# Patient Record
Sex: FEMALE | Race: WHITE | NOT HISPANIC OR LATINO | Employment: FULL TIME | URBAN - METROPOLITAN AREA
[De-identification: names, ages, dates, MRNs, and addresses within clinical notes are randomized per-mention and may not be internally consistent; named-entity substitution may affect disease eponyms.]

---

## 2017-09-25 ENCOUNTER — APPOINTMENT (OUTPATIENT)
Dept: PHYSICAL THERAPY | Facility: REHABILITATION | Age: 37
End: 2017-09-25
Payer: COMMERCIAL

## 2017-09-25 PROCEDURE — 97110 THERAPEUTIC EXERCISES: CPT

## 2017-09-25 PROCEDURE — G8984 CARRY CURRENT STATUS: HCPCS

## 2017-09-25 PROCEDURE — G0283 ELEC STIM OTHER THAN WOUND: HCPCS

## 2017-09-25 PROCEDURE — 97161 PT EVAL LOW COMPLEX 20 MIN: CPT

## 2017-09-25 PROCEDURE — G8985 CARRY GOAL STATUS: HCPCS

## 2017-09-25 PROCEDURE — 97014 ELECTRIC STIMULATION THERAPY: CPT

## 2017-10-02 ENCOUNTER — APPOINTMENT (OUTPATIENT)
Dept: PHYSICAL THERAPY | Facility: REHABILITATION | Age: 37
End: 2017-10-02
Payer: COMMERCIAL

## 2017-10-02 PROCEDURE — 97140 MANUAL THERAPY 1/> REGIONS: CPT

## 2017-10-02 PROCEDURE — 97014 ELECTRIC STIMULATION THERAPY: CPT

## 2017-10-02 PROCEDURE — G0283 ELEC STIM OTHER THAN WOUND: HCPCS

## 2017-10-02 PROCEDURE — 97110 THERAPEUTIC EXERCISES: CPT

## 2017-10-05 ENCOUNTER — APPOINTMENT (OUTPATIENT)
Dept: PHYSICAL THERAPY | Facility: REHABILITATION | Age: 37
End: 2017-10-05
Payer: COMMERCIAL

## 2017-10-05 PROCEDURE — 97110 THERAPEUTIC EXERCISES: CPT

## 2017-10-05 PROCEDURE — 97140 MANUAL THERAPY 1/> REGIONS: CPT

## 2017-10-05 PROCEDURE — 97014 ELECTRIC STIMULATION THERAPY: CPT

## 2017-10-05 PROCEDURE — G0283 ELEC STIM OTHER THAN WOUND: HCPCS

## 2017-10-09 ENCOUNTER — APPOINTMENT (OUTPATIENT)
Dept: PHYSICAL THERAPY | Facility: REHABILITATION | Age: 37
End: 2017-10-09
Payer: COMMERCIAL

## 2017-10-09 PROCEDURE — 97110 THERAPEUTIC EXERCISES: CPT

## 2017-10-09 PROCEDURE — G0283 ELEC STIM OTHER THAN WOUND: HCPCS

## 2017-10-09 PROCEDURE — 97014 ELECTRIC STIMULATION THERAPY: CPT

## 2017-10-16 ENCOUNTER — APPOINTMENT (OUTPATIENT)
Dept: PHYSICAL THERAPY | Facility: REHABILITATION | Age: 37
End: 2017-10-16
Payer: COMMERCIAL

## 2017-10-19 ENCOUNTER — APPOINTMENT (OUTPATIENT)
Dept: PHYSICAL THERAPY | Facility: REHABILITATION | Age: 37
End: 2017-10-19
Payer: COMMERCIAL

## 2017-10-19 PROCEDURE — G0283 ELEC STIM OTHER THAN WOUND: HCPCS

## 2017-10-19 PROCEDURE — 97014 ELECTRIC STIMULATION THERAPY: CPT

## 2017-10-19 PROCEDURE — 97110 THERAPEUTIC EXERCISES: CPT

## 2017-10-19 PROCEDURE — 97140 MANUAL THERAPY 1/> REGIONS: CPT

## 2017-10-23 ENCOUNTER — APPOINTMENT (OUTPATIENT)
Dept: PHYSICAL THERAPY | Facility: REHABILITATION | Age: 37
End: 2017-10-23
Payer: COMMERCIAL

## 2017-10-26 ENCOUNTER — APPOINTMENT (OUTPATIENT)
Dept: PHYSICAL THERAPY | Facility: REHABILITATION | Age: 37
End: 2017-10-26
Payer: COMMERCIAL

## 2017-10-26 PROCEDURE — 97014 ELECTRIC STIMULATION THERAPY: CPT

## 2017-10-26 PROCEDURE — G0283 ELEC STIM OTHER THAN WOUND: HCPCS

## 2017-10-26 PROCEDURE — 97140 MANUAL THERAPY 1/> REGIONS: CPT

## 2017-10-26 PROCEDURE — 97110 THERAPEUTIC EXERCISES: CPT

## 2017-10-30 ENCOUNTER — APPOINTMENT (OUTPATIENT)
Dept: PHYSICAL THERAPY | Facility: REHABILITATION | Age: 37
End: 2017-10-30
Payer: COMMERCIAL

## 2017-10-30 PROCEDURE — 97014 ELECTRIC STIMULATION THERAPY: CPT

## 2017-10-30 PROCEDURE — 97110 THERAPEUTIC EXERCISES: CPT

## 2017-10-30 PROCEDURE — 97140 MANUAL THERAPY 1/> REGIONS: CPT

## 2017-10-30 PROCEDURE — G0283 ELEC STIM OTHER THAN WOUND: HCPCS

## 2018-07-07 DIAGNOSIS — J30.9 ALLERGIC RHINITIS, UNSPECIFIED SEASONALITY, UNSPECIFIED TRIGGER: Primary | ICD-10-CM

## 2018-07-08 RX ORDER — MONTELUKAST SODIUM 10 MG/1
TABLET ORAL
Qty: 90 TABLET | Refills: 2 | Status: SHIPPED | OUTPATIENT
Start: 2018-07-08 | End: 2019-03-31 | Stop reason: SDUPTHER

## 2018-10-18 DIAGNOSIS — F41.9 ANXIETY: Primary | ICD-10-CM

## 2018-12-11 DIAGNOSIS — F41.9 ANXIETY: ICD-10-CM

## 2019-01-09 DIAGNOSIS — M25.50 ARTHRALGIA, UNSPECIFIED JOINT: Primary | ICD-10-CM

## 2019-01-09 RX ORDER — NAPROXEN 500 MG/1
TABLET ORAL
Qty: 60 TABLET | Refills: 0 | Status: SHIPPED | OUTPATIENT
Start: 2019-01-09 | End: 2019-08-15 | Stop reason: SDUPTHER

## 2019-03-03 DIAGNOSIS — F41.9 ANXIETY: ICD-10-CM

## 2019-03-05 NOTE — TELEPHONE ENCOUNTER
Lm that she needs an ov before next appt and she is to call and schedule so there is no delay in her next refill

## 2019-03-31 DIAGNOSIS — J30.9 ALLERGIC RHINITIS, UNSPECIFIED SEASONALITY, UNSPECIFIED TRIGGER: ICD-10-CM

## 2019-04-01 RX ORDER — MONTELUKAST SODIUM 10 MG/1
TABLET ORAL
Qty: 90 TABLET | Refills: 2 | Status: SHIPPED | OUTPATIENT
Start: 2019-04-01 | End: 2019-08-15 | Stop reason: SDUPTHER

## 2019-06-09 DIAGNOSIS — F41.9 ANXIETY: ICD-10-CM

## 2019-08-15 ENCOUNTER — OFFICE VISIT (OUTPATIENT)
Dept: FAMILY MEDICINE CLINIC | Facility: CLINIC | Age: 39
End: 2019-08-15
Payer: COMMERCIAL

## 2019-08-15 VITALS
SYSTOLIC BLOOD PRESSURE: 112 MMHG | RESPIRATION RATE: 16 BRPM | WEIGHT: 219.38 LBS | DIASTOLIC BLOOD PRESSURE: 78 MMHG | HEIGHT: 62 IN | BODY MASS INDEX: 40.37 KG/M2 | OXYGEN SATURATION: 98 % | TEMPERATURE: 97.2 F | HEART RATE: 74 BPM

## 2019-08-15 DIAGNOSIS — E66.01 MORBID OBESITY WITH BMI OF 40.0-44.9, ADULT (HCC): ICD-10-CM

## 2019-08-15 DIAGNOSIS — M25.50 ARTHRALGIA, UNSPECIFIED JOINT: ICD-10-CM

## 2019-08-15 DIAGNOSIS — Z00.00 HEALTHCARE MAINTENANCE: Primary | ICD-10-CM

## 2019-08-15 DIAGNOSIS — J30.9 ALLERGIC RHINITIS, UNSPECIFIED SEASONALITY, UNSPECIFIED TRIGGER: ICD-10-CM

## 2019-08-15 PROBLEM — M54.2 NECK PAIN: Status: ACTIVE | Noted: 2018-10-23

## 2019-08-15 PROBLEM — G89.29 CHRONIC MIDLINE LOW BACK PAIN WITH SCIATICA: Status: ACTIVE | Noted: 2018-10-23

## 2019-08-15 PROBLEM — M67.40 GANGLION OF TENDON SHEATH: Status: ACTIVE | Noted: 2019-08-15

## 2019-08-15 PROBLEM — G56.00 CARPAL TUNNEL SYNDROME: Status: ACTIVE | Noted: 2019-08-15

## 2019-08-15 PROBLEM — T81.89XA NONHEALING SURGICAL WOUND: Status: ACTIVE | Noted: 2019-01-21

## 2019-08-15 PROBLEM — R94.131 ABNORMAL ELECTROMYOGRAPHY: Status: ACTIVE | Noted: 2019-08-15

## 2019-08-15 PROBLEM — Z12.31 SCREENING MAMMOGRAM, ENCOUNTER FOR: Status: ACTIVE | Noted: 2019-04-16

## 2019-08-15 PROBLEM — M54.40 CHRONIC MIDLINE LOW BACK PAIN WITH SCIATICA: Status: ACTIVE | Noted: 2018-10-23

## 2019-08-15 PROBLEM — I72.9 ANEURYSM (HCC): Status: ACTIVE | Noted: 2019-08-15

## 2019-08-15 PROBLEM — K57.92 DIVERTICULITIS: Status: ACTIVE | Noted: 2019-08-15

## 2019-08-15 PROBLEM — Z79.890 HORMONE REPLACEMENT THERAPY (HRT): Status: ACTIVE | Noted: 2019-04-16

## 2019-08-15 PROBLEM — R20.0 NUMBNESS OF HAND: Status: ACTIVE | Noted: 2018-05-30

## 2019-08-15 PROBLEM — N62 MACROMASTIA: Status: ACTIVE | Noted: 2018-10-23

## 2019-08-15 PROBLEM — Z15.09 GENETIC SUSCEPTIBILITY TO OTHER MALIGNANT NEOPLASM: Status: ACTIVE | Noted: 2018-08-20

## 2019-08-15 PROBLEM — R20.9 SKIN SENSATION DISTURBANCE: Status: ACTIVE | Noted: 2019-08-15

## 2019-08-15 PROCEDURE — 99395 PREV VISIT EST AGE 18-39: CPT | Performed by: FAMILY MEDICINE

## 2019-08-15 RX ORDER — MONTELUKAST SODIUM 10 MG/1
10 TABLET ORAL EVERY EVENING
Qty: 90 TABLET | Refills: 2 | Status: SHIPPED | OUTPATIENT
Start: 2019-08-15 | End: 2020-04-19

## 2019-08-15 RX ORDER — FEXOFENADINE HCL 180 MG/1
180 TABLET ORAL
COMMUNITY

## 2019-08-15 RX ORDER — PHENTERMINE HYDROCHLORIDE 37.5 MG/1
37.5 CAPSULE ORAL EVERY MORNING
Qty: 30 CAPSULE | Refills: 0 | Status: SHIPPED | OUTPATIENT
Start: 2019-08-15 | End: 2019-09-13 | Stop reason: SDUPTHER

## 2019-08-15 RX ORDER — NAPROXEN 500 MG/1
500 TABLET ORAL 2 TIMES DAILY WITH MEALS
Qty: 60 TABLET | Refills: 1 | Status: SHIPPED | OUTPATIENT
Start: 2019-08-15 | End: 2019-10-24 | Stop reason: SDUPTHER

## 2019-08-15 RX ORDER — CONJUGATED ESTROGENS 0.62 MG/1
TABLET, FILM COATED ORAL
COMMUNITY
Start: 2019-08-07

## 2019-08-15 RX ORDER — OMEPRAZOLE 40 MG/1
CAPSULE, DELAYED RELEASE ORAL
Refills: 1 | COMMUNITY
Start: 2019-06-10

## 2019-08-15 NOTE — PATIENT INSTRUCTIONS
Obesity   AMBULATORY CARE:   Obesity  is when your body mass index (BMI) is greater than 30  Your healthcare provider will use your height and weight to measure your BMI  The risks of obesity include  many health problems, such as injuries or physical disability  You may need tests to check for the following:  · Diabetes     · High blood pressure or high cholesterol     · Heart disease     · Gallbladder or liver disease     · Cancer of the colon, breast, prostate, liver, or kidney     · Sleep apnea     · Arthritis or gout  Seek care immediately if:   · You have a severe headache, confusion, or difficulty speaking  · You have weakness on one side of your body  · You have chest pain, sweating, or shortness of breath  Contact your healthcare provider if:   · You have symptoms of gallbladder or liver disease, such as pain in your upper abdomen  · You have knee or hip pain and discomfort while walking  · You have symptoms of diabetes, such as intense hunger and thirst, and frequent urination  · You have symptoms of sleep apnea, such as snoring or daytime sleepiness  · You have questions or concerns about your condition or care  Treatment for obesity  focuses on helping you lose weight to improve your health  Even a small decrease in BMI can reduce the risk for many health problems  Your healthcare provider will help you set a weight-loss goal   · Lifestyle changes  are the first step in treating obesity  These include making healthy food choices and getting regular physical activity  Your healthcare provider may suggest a weight-loss program that involves coaching, education, and therapy  · Medicine  may help you lose weight when it is used with a healthy diet and physical activity  · Surgery  can help you lose weight if you are very obese and have other health problems  There are several types of weight-loss surgery  Ask your healthcare provider for more information    Be successful losing weight:   · Set small, realistic goals  An example of a small goal is to walk for 20 minutes 5 days a week  Anther goal is to lose 5% of your body weight  · Tell friends, family members, and coworkers about your goals  and ask for their support  Ask a friend to lose weight with you, or join a weight-loss support group  · Identify foods or triggers that may cause you to overeat , and find ways to avoid them  Remove tempting high-calorie foods from your home and workplace  Place a bowl of fresh fruit on your kitchen counter  If stress causes you to eat, then find other ways to cope with stress  · Keep a diary to track what you eat and drink  Also write down how many minutes of physical activity you do each day  Weigh yourself once a week and record it in your diary  Eating changes: You will need to eat 500 to 1,000 fewer calories each day than you currently eat to lose 1 to 2 pounds a week  The following changes will help you cut calories:  · Eat smaller portions  Use small plates, no larger than 9 inches in diameter  Fill your plate half full of fruits and vegetables  Measure your food using measuring cups until you know what a serving size looks like  · Eat 3 meals and 1 or 2 snacks each day  Plan your meals in advance  Sharon More and eat at home most of the time  Eat slowly  · Eat fruits and vegetables at every meal   They are low in calories and high in fiber, which makes you feel full  Do not add butter, margarine, or cream sauce to vegetables  Use herbs to season steamed vegetables  · Eat less fat and fewer fried foods  Eat more baked or grilled chicken and fish  These protein sources are lower in calories and fat than red meat  Limit fast food  Dress your salads with olive oil and vinegar instead of bottled dressing  · Limit the amount of sugar you eat  Do not drink sugary beverages  Limit alcohol  Activity changes:  Physical activity is good for your body in many ways   It helps you burn calories and build strong muscles  It decreases stress and depression, and improves your mood  It can also help you sleep better  Talk to your healthcare provider before you begin an exercise program   · Exercise for at least 30 minutes 5 days a week  Start slowly  Set aside time each day for physical activity that you enjoy and that is convenient for you  It is best to do both weight training and an activity that increases your heart rate, such as walking, bicycling, or swimming  · Find ways to be more active  Do yard work and housecleaning  Walk up the stairs instead of using elevators  Spend your leisure time going to events that require walking, such as outdoor festivals or fairs  This extra physical activity can help you lose weight and keep it off  Follow up with your healthcare provider as directed: You may need to meet with a dietitian  Write down your questions so you remember to ask them during your visits  © 2017 [address] [employer] Information is for End User's use only and may not be sold, redistributed or otherwise used for commercial purposes  All illustrations and images included in CareNotes® are the copyrighted property of A D [employer] M [employer]  or [employer]  The above information is an  only  It is not intended as medical advice for individual conditions or treatments  Talk to your doctor, nurse or pharmacist before following any medical regimen to see if it is safe and effective for you

## 2019-08-15 NOTE — PROGRESS NOTES
Assessment/Plan:  No problem-specific Assessment & Plan notes found for this encounter  Diagnoses and all orders for this visit:    Healthcare maintenance  Comments: It was discussed about immunizations, diet, exercise and safety measures  Orders:  -     Lipid Panel with Direct LDL reflex; Future    Morbid obesity with BMI of 40 0-44 9, adult ([facility] [address] )  Comments: It was discussed about low carb diet and regular exercise  I am going to start her on phentermine 37 5 mg daily  It was discussed about side effects  Orders:  -     phentermine 37 5 MG capsule; Take 1 capsule (37 5 mg total) by mouth every morning    Allergic rhinitis, unspecified seasonality, unspecified trigger  Comments:  She was given refills  Orders:  -     montelukast (SINGULAIR) 10 mg tablet; Take 1 tablet (10 mg total) by mouth every evening    Arthralgia, unspecified joint  Comments:  She was given refills for naproxen  She is to continue to follow up with chiropractor  Orders:  -     naproxen (NAPROSYN) 500 mg tablet; Take 1 tablet (500 mg total) by mouth 2 (two) times a day with meals    Other orders  -     PREMARIN 0 625 MG tablet  -     omeprazole (PriLOSEC) 40 MG capsule; TAKE 1 (ONE) CAPSULE DAILY, 1/2 HOUR BEFORE BREAKFAST  -     fexofenadine (ALLEGRA) 180 MG tablet; Take 180 mg by mouth          Patient Instructions     Obesity   AMBULATORY CARE:   Obesity  is when your body mass index (BMI) is greater than 30  Your healthcare provider will use your height and weight to measure your BMI  The risks of obesity include  many health problems, such as injuries or physical disability   You may need tests to check for the following:  · Diabetes     · High blood pressure or high cholesterol     · Heart disease     · Gallbladder or liver disease     · Cancer of the colon, breast, prostate, liver, or kidney     · Sleep apnea     · Arthritis or gout  Seek care immediately if:   · You have a severe headache, confusion, or difficulty speaking  · You have weakness on one side of your body  · You have chest pain, sweating, or shortness of breath  Contact your healthcare provider if:   · You have symptoms of gallbladder or liver disease, such as pain in your upper abdomen  · You have knee or hip pain and discomfort while walking  · You have symptoms of diabetes, such as intense hunger and thirst, and frequent urination  · You have symptoms of sleep apnea, such as snoring or daytime sleepiness  · You have questions or concerns about your condition or care  Treatment for obesity  focuses on helping you lose weight to improve your health  Even a small decrease in BMI can reduce the risk for many health problems  Your healthcare provider will help you set a weight-loss goal   · Lifestyle changes  are the first step in treating obesity  These include making healthy food choices and getting regular physical activity  Your healthcare provider may suggest a weight-loss program that involves coaching, education, and therapy  · Medicine  may help you lose weight when it is used with a healthy diet and physical activity  · Surgery  can help you lose weight if you are very obese and have other health problems  There are several types of weight-loss surgery  Ask your healthcare provider for more information  Be successful losing weight:   · Set small, realistic goals  An example of a small goal is to walk for 20 minutes 5 days a week  Anther goal is to lose 5% of your body weight  · Tell friends, family members, and coworkers about your goals  and ask for their support  Ask a friend to lose weight with you, or join a weight-loss support group  · Identify foods or triggers that may cause you to overeat , and find ways to avoid them  Remove tempting high-calorie foods from your home and workplace  Place a bowl of fresh fruit on your kitchen counter  If stress causes you to eat, then find other ways to cope with stress  · Keep a diary to track what you eat and drink  Also write down how many minutes of physical activity you do each day  Weigh yourself once a week and record it in your diary  Eating changes: You will need to eat 500 to 1,000 fewer calories each day than you currently eat to lose 1 to 2 pounds a week  The following changes will help you cut calories:  · Eat smaller portions  Use small plates, no larger than 9 inches in diameter  Fill your plate half full of fruits and vegetables  Measure your food using measuring cups until you know what a serving size looks like  · Eat 3 meals and 1 or 2 snacks each day  Plan your meals in advance  Jolie Martin and eat at home most of the time  Eat slowly  · Eat fruits and vegetables at every meal   They are low in calories and high in fiber, which makes you feel full  Do not add butter, margarine, or cream sauce to vegetables  Use herbs to season steamed vegetables  · Eat less fat and fewer fried foods  Eat more baked or grilled chicken and fish  These protein sources are lower in calories and fat than red meat  Limit fast food  Dress your salads with olive oil and vinegar instead of bottled dressing  · Limit the amount of sugar you eat  Do not drink sugary beverages  Limit alcohol  Activity changes:  Physical activity is good for your body in many ways  It helps you burn calories and build strong muscles  It decreases stress and depression, and improves your mood  It can also help you sleep better  Talk to your healthcare provider before you begin an exercise program   · Exercise for at least 30 minutes 5 days a week  Start slowly  Set aside time each day for physical activity that you enjoy and that is convenient for you  It is best to do both weight training and an activity that increases your heart rate, such as walking, bicycling, or swimming  · Find ways to be more active  Do yard work and housecleaning  Walk up the stairs instead of using elevators  Spend your leisure time going to events that require walking, such as outdoor festivals or fairs  This extra physical activity can help you lose weight and keep it off  Follow up with your healthcare provider as directed: You may need to meet with a dietitian  Write down your questions so you remember to ask them during your visits  © 2017 [address] [employer] Information is for End User's use only and may not be sold, redistributed or otherwise used for commercial purposes  All illustrations and images included in CareNotes® are the copyrighted property of [employer] M [employer] Inc  or [employer]  The above information is an  only  It is not intended as medical advice for individual conditions or treatments  Talk to your doctor, nurse or pharmacist before following any medical regimen to see if it is safe and effective for you  Return in about 1 month (around 9/15/2019)  Subjective:      Patient ID: Nisreen Cormier is a 44 y o  female  Chief Complaint   Patient presents with    Physical Exam       She is here today for wellness exam   She has been seen here for a while  She has been taking her medications  She denies any side effects from her medications  She had breast reduction surgery the end of the last year  She has been trying to exercise but she has been having some pain in her lower back  She tries to watch her diet but she has been losing weight  The following portions of the patient's history were reviewed and updated as appropriate: allergies, current medications, past family history, past medical history, past social history, past surgical history and problem list     Review of Systems   Constitutional: Negative for chills and fever  HENT: Negative for trouble swallowing  Eyes: Negative for visual disturbance  Respiratory: Negative for cough and shortness of breath  Cardiovascular: Negative for chest pain, palpitations and leg swelling  Gastrointestinal: Negative for abdominal pain, constipation and diarrhea  Endocrine: Negative for cold intolerance and heat intolerance  Genitourinary: Negative for difficulty urinating and dysuria  Musculoskeletal: Positive for back pain  Negative for gait problem  Skin: Negative for rash  Neurological: Negative for dizziness, tremors, seizures and headaches  Hematological: Negative for adenopathy  Psychiatric/Behavioral: Negative for behavioral problems  Current Outpatient Medications   Medication Sig Dispense Refill    fexofenadine (ALLEGRA) 180 MG tablet Take 180 mg by mouth      montelukast (SINGULAIR) 10 mg tablet Take 1 tablet (10 mg total) by mouth every evening 90 tablet 2    naproxen (NAPROSYN) 500 mg tablet Take 1 tablet (500 mg total) by mouth 2 (two) times a day with meals 60 tablet 1    omeprazole (PriLOSEC) 40 MG capsule TAKE 1 (ONE) CAPSULE DAILY, 1/2 HOUR BEFORE BREAKFAST  1    PREMARIN 0 625 MG tablet       sertraline (ZOLOFT) 50 mg tablet TAKE 1 & 1/2 TABLETS BY MOUTH DAILY  135 tablet 0    phentermine 37 5 MG capsule Take 1 capsule (37 5 mg total) by mouth every morning 30 capsule 0     No current facility-administered medications for this visit  Objective:    /78 (BP Location: Left arm, Patient Position: Sitting, Cuff Size: Large)   Pulse 74   Temp (!) 97 2 °F (36 2 °C) (Tympanic)   Resp 16   Ht 5' 2" (1 575 m)   Wt 99 5 kg (219 lb 6 oz)   SpO2 98%   BMI 40 12 kg/m²        Physical Exam   Constitutional: She is oriented to person, place, and time  She appears well-developed and well-nourished  HENT:   Head: Normocephalic and atraumatic  Eyes: Pupils are equal, round, and reactive to light  EOM are normal    Neck: Normal range of motion  Neck supple  Cardiovascular: Normal rate, regular rhythm and normal heart sounds  Pulmonary/Chest: Effort normal and breath sounds normal    Abdominal: Soft   Bowel sounds are normal    Musculoskeletal: Normal range of motion  She exhibits no edema  Lymphadenopathy:     She has no cervical adenopathy  Neurological: She is alert and oriented to person, place, and time  No cranial nerve deficit  Skin: Skin is warm  Psychiatric: She has a normal mood and affect  Nursing note and vitals reviewed  Shala Flores MD BMI Counseling: Body mass index is 40 12 kg/m²  Discussed the patient's BMI with her  The BMI is above average  BMI counseling and education was provided to the patient  Nutrition recommendations include reducing portion sizes, decreasing overall calorie intake and 3-5 servings of fruits/vegetables daily  Exercise recommendations include moderate aerobic physical activity for 150 minutes/week

## 2019-08-24 ENCOUNTER — TRANSCRIBE ORDERS (OUTPATIENT)
Dept: ADMINISTRATIVE | Facility: HOSPITAL | Age: 39
End: 2019-08-24

## 2019-08-24 ENCOUNTER — APPOINTMENT (OUTPATIENT)
Dept: LAB | Facility: IMAGING CENTER | Age: 39
End: 2019-08-24
Payer: COMMERCIAL

## 2019-08-24 DIAGNOSIS — Z00.00 HEALTHCARE MAINTENANCE: ICD-10-CM

## 2019-08-24 LAB
CHOLEST SERPL-MCNC: 193 MG/DL (ref 50–200)
HDLC SERPL-MCNC: 50 MG/DL (ref 40–60)
LDLC SERPL CALC-MCNC: 127 MG/DL (ref 0–100)
TRIGL SERPL-MCNC: 80 MG/DL

## 2019-08-24 PROCEDURE — 36415 COLL VENOUS BLD VENIPUNCTURE: CPT

## 2019-08-24 PROCEDURE — 80061 LIPID PANEL: CPT

## 2019-09-03 ENCOUNTER — TELEPHONE (OUTPATIENT)
Dept: FAMILY MEDICINE CLINIC | Facility: CLINIC | Age: 39
End: 2019-09-03

## 2019-09-03 NOTE — TELEPHONE ENCOUNTER
Lm for pt to discuss lab results   I did lm that she can call us back or they will be discussed at ov on 9/13/19

## 2019-09-13 ENCOUNTER — OFFICE VISIT (OUTPATIENT)
Dept: FAMILY MEDICINE CLINIC | Facility: CLINIC | Age: 39
End: 2019-09-13
Payer: COMMERCIAL

## 2019-09-13 VITALS
BODY MASS INDEX: 38.85 KG/M2 | DIASTOLIC BLOOD PRESSURE: 82 MMHG | RESPIRATION RATE: 16 BRPM | HEART RATE: 94 BPM | SYSTOLIC BLOOD PRESSURE: 120 MMHG | OXYGEN SATURATION: 96 % | TEMPERATURE: 98 F | HEIGHT: 62 IN | WEIGHT: 211.13 LBS

## 2019-09-13 DIAGNOSIS — F41.9 ANXIETY: Primary | ICD-10-CM

## 2019-09-13 DIAGNOSIS — E66.09 CLASS 2 OBESITY DUE TO EXCESS CALORIES WITHOUT SERIOUS COMORBIDITY WITH BODY MASS INDEX (BMI) OF 38.0 TO 38.9 IN ADULT: ICD-10-CM

## 2019-09-13 DIAGNOSIS — J45.20 MILD INTERMITTENT ASTHMA, UNSPECIFIED WHETHER COMPLICATED: ICD-10-CM

## 2019-09-13 DIAGNOSIS — Z23 ENCOUNTER FOR IMMUNIZATION: ICD-10-CM

## 2019-09-13 PROBLEM — E66.9 OBESITY (BMI 30-39.9): Status: ACTIVE | Noted: 2019-09-13

## 2019-09-13 PROCEDURE — 99213 OFFICE O/P EST LOW 20 MIN: CPT | Performed by: FAMILY MEDICINE

## 2019-09-13 RX ORDER — PHENTERMINE HYDROCHLORIDE 37.5 MG/1
37.5 CAPSULE ORAL EVERY MORNING
Qty: 30 CAPSULE | Refills: 0 | Status: SHIPPED | OUTPATIENT
Start: 2019-09-13 | End: 2019-10-18 | Stop reason: SDUPTHER

## 2019-09-13 NOTE — ASSESSMENT & PLAN NOTE
Fairly controlled  Continue Zoloft  Take Ativan as needed for severe anxiety symptoms  Will continue to monitor

## 2019-09-13 NOTE — PROGRESS NOTES
Assessment/Plan:  Asthma  Well controlled  Continue same regimen  Obesity (BMI 30-39  9)  Down 8 lbs since last visit  Continue phentermine, low carb, low fat diet, and exercise  Will continue to monitor  Anxiety  Fairly controlled  Continue Zoloft  Take Ativan as needed for severe anxiety symptoms  Will continue to monitor  Morbid obesity with BMI of 40 0-44 9, adult ([facility] [address] )  She lost 8 lb  Continue same  She was given refill  It was discussed about low carb diet and regular exercise      Romaine Welch  Age: 44 Data as of: 09/13/2019   Demographics     Linked Records  Search Criteria     Summary     Summary   Total Prescriptions: 9   Total Prescribers: 3   Total Pharmacies: 1   Narcotics*     (excluding buprenorphine)  Current Qty: 0   Current MME/day: 0 00   30 Day Avg MME/day: 0 00   Buprenorphine*   Current Qty: 0   Current mg/day: 0 00   30 Day Avg mg/day: 0 00      Prescriptions     PRESCRIPTIONS  Total Prescriptions: 9   Total Private Pay: 0   Fill Date ID Written Drug Qty Days Prescriber Rx # Pharmacy Refill Daily Dose * Pymt Type    08/15/2019  1   08/15/2019  Phentermine 37 5 MG Capsule  30 00 30 Wa Abo  [id number]   Pen (3387)  0   [place]   12/12/2018  1   12/12/2018  Oxycodone-Acetaminophen 5-325  20 00 5 Na Mil  [id number]   Pen (3387)  0  30 00 MME  [place]   02/26/2018  1   02/06/2018  Phentermine 30 MG Capsule  30 00 30 Wa Abo  [id number]   Pen (3387)  0   [place]   01/22/2018  1   01/05/2018  Phentermine 30 MG Capsule  30 00 30 Wa Abo  [id number]   Pen (3387)  0   [place]   12/08/2017  1   11/28/2017  Phentermine 30 MG Capsule  30 00 30 Wa Abo  [id number]   Pen (3387)  0   [place]   11/11/2017  1   11/10/2017  Tramadol Hcl 50 MG Tablet  30 00 30 Ji   [id number]   Pen (3387)  0  5 00 MME  [place]   11/09/2017  1   11/07/2017  Phentermine 30 MG Capsule  30 00 30 Wa Abo  [id number]   Pen (3387)  0   [place]   10/10/2017  1   10/10/2017  Phentermine 30 MG Capsule  30 00 30 Wa Abo  [id number]   Pen (2460)  0   [place]   10/09/2017  1   09/08/2017  Lorazepam 0 5 MG Tablet  30 00 30 Wa Abo  [id number]   Pen (6928)  0   [place]   *Per CDC guidance, the MME conversion factors prescribed or provided as part of the medication-assisted treatment for opioid use disorder should not be used to benchmark against dosage thresholds meant for opioids prescribed for pain  Buprenorphine products have no agreed upon morphine equivalency, and as partial opioid agonists, are not expected to be associated with overdose risk in the same dose-dependent manner as doses for full agonist opioids  MME = morphine milligram equivalents  LME = Lorazepam milligram equivalents  MG = dose in milligrams  PROVIDERS  Total Providers: 3   Name [place] Phone   [address] [facility] [address] [facility] [address] [facility] [place] [place] [address] [facility] [address]  Total Pharmacies: 1   Name [place] Phone   [address]  [facility]  [phone] [facility] [phone]        Diagnoses and all orders for this visit:    Anxiety    Mild intermittent asthma, unspecified whether complicated    Class 2 obesity due to excess calories without serious comorbidity with body mass index (BMI) of 38 0 to 38 9 in adult  -     phentermine 37 5 MG capsule; Take 1 capsule (37 5 mg total) by mouth every morning    Encounter for immunization  -     TDAP VACCINE GREATER THAN OR EQUAL TO 6YO IM          There are no Patient Instructions on file for this visit  Return in about 1 month (around 10/13/2019)  Subjective:      Patient ID: Nisreen Cormier is a 44 y o  female      Chief Complaint   Patient presents with    Weight Check     one month f/u       Vashti Madison is a 44 y o  F with a PMH of asthma, anxiety, depression, and obesity presenting to the office today for 1 month follow up for weight management  She is down 8 lbs since her last visit a month ago  She states she has been doing the keto diet since August with some cheat days every couple weeks and is exercising a few times a week  She is taking the phentermine as prescribed and complains of no side effects  She is also asking about referral for a sleep study as when she went for her colonoscopy this past August they asked if she had had one  She had 3 polyps removed and receives annual colonoscopies  She also states that with the start of school and with work stressors that her anxiety is acting up some  The Zoloft generally works, but not for these acute symptoms  She does have Ativan that she takes when her symptoms get really bad  She otherwise has no complaints  Her LDL is elevated at 127  The following portions of the patient's history were reviewed and updated as appropriate: allergies, current medications, past family history, past medical history, past social history, past surgical history and problem list     Review of Systems   Constitutional: Negative for chills and fever  HENT: Negative for sore throat and trouble swallowing  Eyes: Negative for visual disturbance  Respiratory: Negative for cough and shortness of breath  Cardiovascular: Negative for chest pain, palpitations and leg swelling  Gastrointestinal: Negative for abdominal pain, constipation and diarrhea  Endocrine: Negative for cold intolerance and heat intolerance  Genitourinary: Negative for difficulty urinating and dysuria  Musculoskeletal: Negative for gait problem  Skin: Negative for rash  Neurological: Negative for dizziness, tremors, seizures, syncope and headaches  Hematological: Negative for adenopathy  Psychiatric/Behavioral: Negative for behavioral problems, self-injury and suicidal ideas  The patient is nervous/anxious (improved as the day has gone on)            Current Outpatient Medications   Medication Sig Dispense Refill    fexofenadine (ALLEGRA) 180 MG tablet Take 180 mg by mouth      montelukast (SINGULAIR) 10 mg tablet Take 1 tablet (10 mg total) by mouth every evening 90 tablet 2    naproxen (NAPROSYN) 500 mg tablet Take 1 tablet (500 mg total) by mouth 2 (two) times a day with meals 60 tablet 1    omeprazole (PriLOSEC) 40 MG capsule TAKE 1 (ONE) CAPSULE DAILY, 1/2 HOUR BEFORE BREAKFAST  1    phentermine 37 5 MG capsule Take 1 capsule (37 5 mg total) by mouth every morning 30 capsule 0    PREMARIN 0 625 MG tablet       sertraline (ZOLOFT) 50 mg tablet TAKE 1 & 1/2 TABLETS BY MOUTH DAILY  135 tablet 0     No current facility-administered medications for this visit  Objective:    /82 (BP Location: Left arm, Patient Position: Sitting, Cuff Size: Large)   Pulse 94   Temp 98 °F (36 7 °C) (Tympanic)   Resp 16   Ht 5' 2" (1 575 m)   Wt 95 8 kg (211 lb 2 oz)   SpO2 96%   BMI 38 62 kg/m²        Physical Exam   Constitutional: She is oriented to person, place, and time  She appears well-developed and well-nourished  HENT:   Head: Normocephalic and atraumatic  Eyes: Pupils are equal, round, and reactive to light  EOM are normal    Neck: Normal range of motion  Neck supple  Cardiovascular: Normal rate, regular rhythm and normal heart sounds  Exam reveals no gallop and no friction rub  No murmur heard  Pulmonary/Chest: Effort normal and breath sounds normal  No stridor  She has no wheezes  She has no rales  Abdominal: Soft  Bowel sounds are normal  She exhibits no distension  There is no tenderness  Musculoskeletal: Normal range of motion  She exhibits no edema  Lymphadenopathy:     She has no cervical adenopathy  Neurological: She is alert and oriented to person, place, and time  No cranial nerve deficit  Skin: Skin is warm and dry  Psychiatric: She has a normal mood and affect  Her behavior is normal    Nursing note and vitals reviewed  Herman Marvin MD

## 2019-09-13 NOTE — ASSESSMENT & PLAN NOTE
Down 8 lbs since last visit  Continue phentermine, low carb, low fat diet, and exercise  Will continue to monitor

## 2019-09-13 NOTE — ASSESSMENT & PLAN NOTE
She lost 8 lb  Continue same  She was given refill  It was discussed about low carb diet and regular exercise

## 2019-09-19 DIAGNOSIS — F41.9 ANXIETY: ICD-10-CM

## 2019-10-18 ENCOUNTER — OFFICE VISIT (OUTPATIENT)
Dept: FAMILY MEDICINE CLINIC | Facility: CLINIC | Age: 39
End: 2019-10-18
Payer: COMMERCIAL

## 2019-10-18 VITALS
RESPIRATION RATE: 16 BRPM | HEART RATE: 92 BPM | TEMPERATURE: 98 F | HEIGHT: 62 IN | SYSTOLIC BLOOD PRESSURE: 122 MMHG | WEIGHT: 204.4 LBS | DIASTOLIC BLOOD PRESSURE: 80 MMHG | BODY MASS INDEX: 37.61 KG/M2 | OXYGEN SATURATION: 98 %

## 2019-10-18 DIAGNOSIS — E66.09 CLASS 2 OBESITY DUE TO EXCESS CALORIES WITHOUT SERIOUS COMORBIDITY WITH BODY MASS INDEX (BMI) OF 37.0 TO 37.9 IN ADULT: ICD-10-CM

## 2019-10-18 DIAGNOSIS — F41.9 ANXIETY: Primary | ICD-10-CM

## 2019-10-18 PROCEDURE — 3008F BODY MASS INDEX DOCD: CPT | Performed by: FAMILY MEDICINE

## 2019-10-18 PROCEDURE — 99213 OFFICE O/P EST LOW 20 MIN: CPT | Performed by: FAMILY MEDICINE

## 2019-10-18 RX ORDER — CLOBETASOL PROPIONATE 0.05 MG/G
GEL TOPICAL
COMMUNITY
Start: 2019-10-10 | End: 2020-02-11 | Stop reason: ALTCHOICE

## 2019-10-18 RX ORDER — PHENTERMINE HYDROCHLORIDE 37.5 MG/1
37.5 CAPSULE ORAL EVERY MORNING
Qty: 30 CAPSULE | Refills: 0 | Status: SHIPPED | OUTPATIENT
Start: 2019-10-18 | End: 2019-11-26 | Stop reason: SDUPTHER

## 2019-10-18 NOTE — ASSESSMENT & PLAN NOTE
It was discussed about low carb diet and regular exercise  Continue on phentermine  She was given refills  We will continue to monitor

## 2019-10-18 NOTE — PROGRESS NOTES
Assessment/Plan:  Anxiety  Stable  Continue same  Will continue to monitor  Class 2 obesity due to excess calories without serious comorbidity with body mass index (BMI) of 37 0 to 37 9 in adult  It was discussed about low carb diet and regular exercise  Continue on phentermine  She was given refills  We will continue to monitor      Marilee Murray  Age: 44 Data as of: 10/18/2019   Demographics     Linked Records  Search Criteria     Summary     Summary   Total Prescriptions: 8   Total Prescribers: 3   Total Pharmacies: 1   Narcotics*     (excluding buprenorphine)  Current Qty: 0   Current MME/day: 0 00   30 Day Avg MME/day: 0 00   Buprenorphine*   Current Qty: 0   Current mg/day: 0 00   30 Day Avg mg/day: 0 00      Prescriptions     PRESCRIPTIONS  Total Prescriptions: 8   Total Private Pay: 0   Fill Date ID Written Drug Qty Days Prescriber Rx # Pharmacy Refill Daily Dose * Pymt Type    09/16/2019  1   09/13/2019  Phentermine 37 5 MG Capsule  30 00 30 Wa Abo  [id number]   Pen (3387)  0   [place]   08/15/2019  1   08/15/2019  Phentermine 37 5 MG Capsule  30 00 30 Wa Abo  [id number]   Pen (3387)  0   [place]   12/12/2018  1   12/12/2018  Oxycodone-Acetaminophen 5-325  20 00 5 Na Mil  [id number]   Pen (3387)  0  30 00 MME  [place]   02/26/2018  1   02/06/2018  Phentermine 30 MG Capsule  30 00 30 Wa Abo  [id number]   Pen (3387)  0   [place]   01/22/2018  1   01/05/2018  Phentermine 30 MG Capsule  30 00 30 Wa Abo  [id number]   Pen (3387)  0   [place]   12/08/2017  1   11/28/2017  Phentermine 30 MG Capsule  30 00 30 Wa Abo  [id number]   Pen (3387)  0   [place]   11/11/2017  1   11/10/2017  Tramadol Hcl 50 MG Tablet  30 00 30 Ji   [id number]   Pen (3387)  0  5 00 MME  [place]   11/09/2017  1   11/07/2017  Phentermine 30 MG Capsule  30 00 30 Wa Abo  [id number]   Pen (6206)  0   [place] Ins  [place]   *Per CDC guidance, the MME conversion factors prescribed or provided as part of the medication-assisted treatment for opioid use disorder should not be used to benchmark against dosage thresholds meant for opioids prescribed for pain  Buprenorphine products have no agreed upon morphine equivalency, and as partial opioid agonists, are not expected to be associated with overdose risk in the same dose-dependent manner as doses for full agonist opioids  MME = morphine milligram equivalents  LME = Lorazepam milligram equivalents  MG = dose in milligrams  PROVIDERS  Total Providers: 3   Name [facility] [place] Phone   Florence Beltran [address] [facility] [address] [facility] [address] [place] MD Sagrario Adkins [address] [place] Mayuri García MD [address] [facility]  Total Pharmacies: 1   Name [facility] [employer] Phone   [address]  [facility] [place] [facility]  [phone] [address] [facility] [phone]        Diagnoses and all orders for this visit:    Anxiety    Class 2 obesity due to excess calories without serious comorbidity with body mass index (BMI) of 37 0 to 37 9 in adult  -     phentermine 37 5 MG capsule; Take 1 capsule (37 5 mg total) by mouth every morning    Other orders  -     clobetasol (TEMOVATE) 0 05 % GEL          There are no Patient Instructions on file for this visit  Return in about 1 month (around 11/18/2019)  Subjective:      Patient ID: Jamey William is a 44 y o  female  Chief Complaint   Patient presents with    Obesity       She is here today for follow-up anxiety and weight management  She continues on sertraline  She is doing well  She denies any side effects from medication  She continues to take her phentermine  She lost 7 lb since the last visit  She has been watching her diet and exercising  She denies any side effect of the medication        The following portions of the patient's history were reviewed and updated as appropriate: allergies, current medications, past family history, past medical history, past social history, past surgical history and problem list     Review of Systems   Constitutional: Negative for chills and fever  HENT: Negative for trouble swallowing  Eyes: Negative for visual disturbance  Respiratory: Negative for cough and shortness of breath  Cardiovascular: Negative for chest pain, palpitations and leg swelling  Gastrointestinal: Negative for abdominal pain, constipation and diarrhea  Endocrine: Negative for cold intolerance and heat intolerance  Genitourinary: Negative for difficulty urinating and dysuria  Musculoskeletal: Negative for gait problem  Skin: Negative for rash  Neurological: Negative for dizziness, tremors, seizures and headaches  Hematological: Negative for adenopathy  Psychiatric/Behavioral: Negative for behavioral problems  Current Outpatient Medications   Medication Sig Dispense Refill    clobetasol (TEMOVATE) 0 05 % GEL       fexofenadine (ALLEGRA) 180 MG tablet Take 180 mg by mouth      montelukast (SINGULAIR) 10 mg tablet Take 1 tablet (10 mg total) by mouth every evening 90 tablet 2    naproxen (NAPROSYN) 500 mg tablet Take 1 tablet (500 mg total) by mouth 2 (two) times a day with meals 60 tablet 1    omeprazole (PriLOSEC) 40 MG capsule TAKE 1 (ONE) CAPSULE DAILY, 1/2 HOUR BEFORE BREAKFAST  1    phentermine 37 5 MG capsule Take 1 capsule (37 5 mg total) by mouth every morning 30 capsule 0    PREMARIN 0 625 MG tablet       sertraline (ZOLOFT) 50 mg tablet Take 1 5 tablets (75 mg total) by mouth daily 135 tablet 0     No current facility-administered medications for this visit  Objective:    /80 (BP Location: Left arm, Patient Position: Sitting, Cuff Size: Large)   Pulse 92   Temp 98 °F (36 7 °C) (Tympanic)   Resp 16   Ht 5' 2" (1 575 m)   Wt 92 7 kg (204 lb 6 4 oz)   SpO2 98%   BMI 37 39 kg/m²        Physical Exam   Constitutional: She is oriented to person, place, and time   She appears well-developed and well-nourished  HENT:   Head: Normocephalic and atraumatic  Eyes: Pupils are equal, round, and reactive to light  EOM are normal    Neck: Normal range of motion  Neck supple  Cardiovascular: Normal rate, regular rhythm and normal heart sounds  Pulmonary/Chest: Effort normal and breath sounds normal    Abdominal: Soft  Bowel sounds are normal    Musculoskeletal: Normal range of motion  She exhibits no edema  Lymphadenopathy:     She has no cervical adenopathy  Neurological: She is alert and oriented to person, place, and time  No cranial nerve deficit  Skin: Skin is warm  Psychiatric: She has a normal mood and affect  Nursing note and vitals reviewed               Kaitlin Escobar MD

## 2019-10-24 DIAGNOSIS — M25.50 ARTHRALGIA, UNSPECIFIED JOINT: ICD-10-CM

## 2019-10-25 RX ORDER — NAPROXEN 500 MG/1
TABLET ORAL
Qty: 60 TABLET | Refills: 12 | Status: SHIPPED | OUTPATIENT
Start: 2019-10-25 | End: 2021-01-04

## 2019-11-26 ENCOUNTER — OFFICE VISIT (OUTPATIENT)
Dept: FAMILY MEDICINE CLINIC | Facility: CLINIC | Age: 39
End: 2019-11-26
Payer: COMMERCIAL

## 2019-11-26 VITALS
HEIGHT: 62 IN | TEMPERATURE: 98.4 F | HEART RATE: 86 BPM | RESPIRATION RATE: 16 BRPM | OXYGEN SATURATION: 97 % | BODY MASS INDEX: 37.5 KG/M2 | SYSTOLIC BLOOD PRESSURE: 124 MMHG | WEIGHT: 203.8 LBS | DIASTOLIC BLOOD PRESSURE: 80 MMHG

## 2019-11-26 DIAGNOSIS — G43.709 CHRONIC MIGRAINE WITHOUT AURA WITHOUT STATUS MIGRAINOSUS, NOT INTRACTABLE: Primary | ICD-10-CM

## 2019-11-26 DIAGNOSIS — E66.09 CLASS 2 OBESITY DUE TO EXCESS CALORIES WITHOUT SERIOUS COMORBIDITY WITH BODY MASS INDEX (BMI) OF 37.0 TO 37.9 IN ADULT: ICD-10-CM

## 2019-11-26 PROCEDURE — 1036F TOBACCO NON-USER: CPT | Performed by: FAMILY MEDICINE

## 2019-11-26 PROCEDURE — 99214 OFFICE O/P EST MOD 30 MIN: CPT | Performed by: FAMILY MEDICINE

## 2019-11-26 RX ORDER — TOPIRAMATE 25 MG/1
25 TABLET ORAL 2 TIMES DAILY
Qty: 60 TABLET | Refills: 3 | Status: SHIPPED | OUTPATIENT
Start: 2019-11-26

## 2019-11-26 RX ORDER — PHENTERMINE HYDROCHLORIDE 37.5 MG/1
37.5 CAPSULE ORAL EVERY MORNING
Qty: 30 CAPSULE | Refills: 0 | Status: SHIPPED | OUTPATIENT
Start: 2019-11-26 | End: 2019-12-30 | Stop reason: SDUPTHER

## 2019-11-26 NOTE — ASSESSMENT & PLAN NOTE
She was given a prescription for Topamax 25 mg 1 tablet daily for 1 week and then twice a day  It was discussed about possible side effect of the medication

## 2019-11-26 NOTE — PROGRESS NOTES
Assessment/Plan:  Chronic migraine without aura, not intractable  She was given a prescription for Topamax 25 mg 1 tablet daily for 1 week and then twice a day  It was discussed about possible side effect of the medication  Class 2 obesity due to excess calories without serious comorbidity with body mass index (BMI) of 37 0 to 37 9 in adult  Improved  She lost 1 lb  It was discussed about low carb diet and regular exercise  She was given refills for phentermine  She was told Topamax can help with her weight loss too    Silvia Dec  Age: 44 Data as of: 11/26/2019   Demographics     Linked Records                Search Criteria               Summary     Summary   Total Prescriptions: 7   Total Prescribers: 2   Total Pharmacies: 1   Narcotics*     (excluding buprenorphine)  Current Qty: 0   Current MME/day: 0 00   30 Day Avg MME/day: 0 00   Buprenorphine*   Current Qty: 0   Current mg/day: 0 00   30 Day Avg mg/day: 0 00      Prescriptions     PRESCRIPTIONS  Total Prescriptions: 7   Total Private Pay: 0   Fill Date ID Written Drug Qty Days Prescriber Rx # Pharmacy Refill Daily Dose * Pymt Type    10/18/2019  1   10/18/2019  Phentermine 37 5 MG Capsule  30 00 30 Wa Abo  [id number]   Pen (3387)  0   [place]   09/16/2019  1   09/13/2019  Phentermine 37 5 MG Capsule  30 00 30 Wa Abo  [id number]   Pen (3387)  0   [place] Ins  [place]   08/15/2019  1   08/15/2019  Phentermine 37 5 MG Capsule  30 00 30 Wa Abo  [id number]   Pen (3387)  0   Comm Ins  [place]   12/12/2018  1   12/12/2018  Oxycodone-Acetaminophen 5-325  20 00 5 Na Mil  [id number]   Pen (3387)  0  30 00 MME  Comm Ins  [place]   02/26/2018  1   02/06/2018  Phentermine 30 MG Capsule  30 00 30 Wa Abo  [id number]   Pen (3387)  0   [place] Ins  [place]   01/22/2018  1   01/05/2018  Phentermine 30 MG Capsule  30 00 30 Wa Abo  [id number]   Pen (3387)  0   Comm Ins  [place]   12/08/2017  1   11/28/2017  Phentermine 30 MG Capsule  30 00 30 Wa Abo  [id number]   Pen (3387)  0   Comm Ins  [place]   *Per CDC guidance, the MME conversion factors prescribed or provided as part of the medication-assisted treatment for opioid use disorder should not be used to benchmark against dosage thresholds meant for opioids prescribed for pain  Buprenorphine products have no agreed upon morphine equivalency, and as partial opioid agonists, are not expected to be associated with overdose risk in the same dose-dependent manner as doses for full agonist opioids  MME = morphine milligram equivalents  LME = Lorazepam milligram equivalents  MG = dose in milligrams  PROVIDERS  Total Providers: 2   Name [facility] [place] Phone   MD Chavez Alexandre MD [phone]    PHARMACIES  Total Pharmacies: 1   Name [place] Phone   [address]  [facility]  [phone] [facility] [phone]          Diagnoses and all orders for this visit:    Chronic migraine without aura without status migrainosus, not intractable  -     topiramate (TOPAMAX) 25 mg tablet; Take 1 tablet (25 mg total) by mouth 2 (two) times a day    Class 2 obesity due to excess calories without serious comorbidity with body mass index (BMI) of 37 0 to 37 9 in adult  -     phentermine 37 5 MG capsule; Take 1 capsule (37 5 mg total) by mouth every morning          There are no Patient Instructions on file for this visit  Return in about 1 month (around 12/26/2019)  Subjective:      Patient ID: Kavitha Ward is a 44 y o  female  Chief Complaint   Patient presents with    Obesity     1 month follow up       She is here today for follow-up for weight management  She has been taking phentermine  She lost 1 lb only  She stated that is been hard for her to find time to exercise  She has been watching her diet  She has been complaining of migraine headaches again  She takes Tylenol or ibuprofen        The following portions of the patient's history were reviewed and updated as appropriate: allergies, current medications, past family history, past medical history, past social history, past surgical history and problem list     Review of Systems   Constitutional: Negative for chills and fever  HENT: Negative for trouble swallowing  Eyes: Negative for visual disturbance  Respiratory: Negative for cough and shortness of breath  Cardiovascular: Negative for chest pain, palpitations and leg swelling  Gastrointestinal: Negative for abdominal pain, constipation and diarrhea  Endocrine: Negative for cold intolerance and heat intolerance  Genitourinary: Negative for difficulty urinating and dysuria  Musculoskeletal: Negative for gait problem  Skin: Negative for rash  Neurological: Positive for headaches  Negative for dizziness, tremors and seizures  Hematological: Negative for adenopathy  Psychiatric/Behavioral: Negative for behavioral problems  Current Outpatient Medications   Medication Sig Dispense Refill    fexofenadine (ALLEGRA) 180 MG tablet Take 180 mg by mouth      montelukast (SINGULAIR) 10 mg tablet Take 1 tablet (10 mg total) by mouth every evening 90 tablet 2    naproxen (NAPROSYN) 500 mg tablet TAKE 1 TABLET TWICE A DAY WITH MEALS 60 tablet 12    omeprazole (PriLOSEC) 40 MG capsule TAKE 1 (ONE) CAPSULE DAILY, 1/2 HOUR BEFORE BREAKFAST  1    phentermine 37 5 MG capsule Take 1 capsule (37 5 mg total) by mouth every morning 30 capsule 0    PREMARIN 0 625 MG tablet       sertraline (ZOLOFT) 50 mg tablet Take 1 5 tablets (75 mg total) by mouth daily 135 tablet 0    clobetasol (TEMOVATE) 0 05 % GEL       topiramate (TOPAMAX) 25 mg tablet Take 1 tablet (25 mg total) by mouth 2 (two) times a day 60 tablet 3     No current facility-administered medications for this visit          Objective:    /80 (BP Location: Left arm, Patient Position: Sitting, Cuff Size: Large)   Pulse 86   Temp 98 4 °F (36 9 °C) (Tympanic)   Resp 16   Ht 5' 2" (1 575 m)   Wt 92 4 kg (203 lb 12 8 oz)   SpO2 97%   BMI 37 28 kg/m²        Physical Exam   Constitutional: She is oriented to person, place, and time  She appears well-developed and well-nourished  HENT:   Head: Normocephalic and atraumatic  Eyes: Pupils are equal, round, and reactive to light  EOM are normal    Neck: Normal range of motion  Neck supple  Cardiovascular: Normal rate, regular rhythm and normal heart sounds  Pulmonary/Chest: Effort normal and breath sounds normal    Abdominal: Soft  Bowel sounds are normal    Musculoskeletal: Normal range of motion  She exhibits no edema  Lymphadenopathy:     She has no cervical adenopathy  Neurological: She is alert and oriented to person, place, and time  No cranial nerve deficit  Skin: Skin is warm  Psychiatric: She has a normal mood and affect  Nursing note and vitals reviewed               Chuck Schwartz MD

## 2019-11-26 NOTE — ASSESSMENT & PLAN NOTE
Improved  She lost 1 lb  It was discussed about low carb diet and regular exercise  She was given refills for phentermine  She was told Topamax can help with her weight loss too

## 2019-11-28 DIAGNOSIS — F41.9 ANXIETY: ICD-10-CM

## 2019-12-17 ENCOUNTER — TELEPHONE (OUTPATIENT)
Dept: FAMILY MEDICINE CLINIC | Facility: CLINIC | Age: 39
End: 2019-12-17

## 2019-12-17 ENCOUNTER — PATIENT MESSAGE (OUTPATIENT)
Dept: FAMILY MEDICINE CLINIC | Facility: CLINIC | Age: 39
End: 2019-12-17

## 2019-12-17 NOTE — TELEPHONE ENCOUNTER
Pt sent e mail requesting work excuse for 12/11/19 which Dr Awilda Teixeira did approve   I emailed her back that the letter would be at  and also left voicemail

## 2019-12-17 NOTE — LETTER
December 11,2019     Patient: Darline Salter   YOB: 1980   Date of Visit: 12/11/19       To Whom it May Concern:    Please excuse Crow Seen from work on 12/11/19 for medical reasons        If you have any questions or concerns, please don't hesitate to call           Sincerely,        Sasha Lance MD

## 2019-12-30 ENCOUNTER — OFFICE VISIT (OUTPATIENT)
Dept: FAMILY MEDICINE CLINIC | Facility: CLINIC | Age: 39
End: 2019-12-30
Payer: COMMERCIAL

## 2019-12-30 VITALS
WEIGHT: 206.38 LBS | DIASTOLIC BLOOD PRESSURE: 86 MMHG | OXYGEN SATURATION: 96 % | HEIGHT: 62 IN | SYSTOLIC BLOOD PRESSURE: 138 MMHG | HEART RATE: 91 BPM | TEMPERATURE: 98.6 F | BODY MASS INDEX: 37.98 KG/M2 | RESPIRATION RATE: 16 BRPM

## 2019-12-30 DIAGNOSIS — G43.709 CHRONIC MIGRAINE WITHOUT AURA WITHOUT STATUS MIGRAINOSUS, NOT INTRACTABLE: Primary | ICD-10-CM

## 2019-12-30 DIAGNOSIS — E66.09 CLASS 2 OBESITY DUE TO EXCESS CALORIES WITHOUT SERIOUS COMORBIDITY WITH BODY MASS INDEX (BMI) OF 37.0 TO 37.9 IN ADULT: ICD-10-CM

## 2019-12-30 PROCEDURE — 99213 OFFICE O/P EST LOW 20 MIN: CPT | Performed by: FAMILY MEDICINE

## 2019-12-30 PROCEDURE — 1036F TOBACCO NON-USER: CPT | Performed by: FAMILY MEDICINE

## 2019-12-30 PROCEDURE — 3008F BODY MASS INDEX DOCD: CPT | Performed by: FAMILY MEDICINE

## 2019-12-30 RX ORDER — PHENTERMINE HYDROCHLORIDE 37.5 MG/1
37.5 CAPSULE ORAL EVERY MORNING
Qty: 30 CAPSULE | Refills: 0 | Status: SHIPPED | OUTPATIENT
Start: 2019-12-30 | End: 2020-02-11 | Stop reason: SDUPTHER

## 2019-12-30 NOTE — ASSESSMENT & PLAN NOTE
Not well controlled  She was told to take 2 tablets Topamax instead of 1  We will continue to monitor  Come back in 1 month

## 2019-12-30 NOTE — PROGRESS NOTES
Assessment/Plan:  Class 2 obesity due to excess calories without serious comorbidity with body mass index (BMI) of 37 0 to 37 9 in adult  Not improving  It was discussed about low carb diet and regular exercise  She was given refill of phentermine  It is not better by the next visit I will consider stopping her phentermine  Chronic migraine without aura, not intractable  Not well controlled  She was told to take 2 tablets Topamax instead of 1  We will continue to monitor  Come back in 1 month    Nadine Huffman  Age: 44 Data as of: 12/30/2019   Demographics     Linked Records                Search Criteria               Summary     Summary   Total Prescriptions: 7   Total Prescribers: 2   Total Pharmacies: 1   Narcotics*     (excluding buprenorphine)  Current Qty: 0   Current MME/day: 0 00   30 Day Avg MME/day: 0 00   Buprenorphine*   Current Qty: 0   Current mg/day: 0 00   30 Day Avg mg/day: 0 00      Prescriptions     PRESCRIPTIONS  Total Prescriptions: 7   Total Private Pay: 0   Fill Date ID Written Drug Qty Days Prescriber Rx # Pharmacy Refill Daily Dose * Pymt Type    11/26/2019  1   11/26/2019  Phentermine 37 5 MG Capsule  30 00 30 Wa Abo  [id number]   Pen (3387)  0   Comm Ins  [place]   10/18/2019  1   10/18/2019  Phentermine 37 5 MG Capsule  30 00 30 Wa Abo  [id number]   Pen (3387)  0   Comm Ins  [place]   09/16/2019  1   09/13/2019  Phentermine 37 5 MG Capsule  30 00 30 Wa Abo  [id number]   Pen (3387)  0   Comm Ins  [place]   08/15/2019  1   08/15/2019  Phentermine 37 5 MG Capsule  30 00 30 Wa Abo  [id number]   Pen (3387)  0   Comm Ins  [place]   12/12/2018  1   12/12/2018  Oxycodone-Acetaminophen 5-325  20 00 5 Na Mil  [id number]   Pen (3387)  0  30 00 MME  Comm Ins  [place]   02/26/2018  1   02/06/2018  Phentermine 30 MG Capsule  30 00 30 Wa Abo  [id number]   Pen (3387)  0   Comm Ins  [place]   01/22/2018  1   01/05/2018  Phentermine 30 MG Capsule  30 00 30 Wa Abo  [id number]   Pen (3546)  0   Comm Ins  [place]   *Per CDC guidance, the MME conversion factors prescribed or provided as part of the medication-assisted treatment for opioid use disorder should not be used to benchmark against dosage thresholds meant for opioids prescribed for pain  Buprenorphine products have no agreed upon morphine equivalency, and as partial opioid agonists, are not expected to be associated with overdose risk in the same dose-dependent manner as doses for full agonist opioids  MME = morphine milligram equivalents  LME = Lorazepam milligram equivalents  MG = dose in milligrams  PROVIDERS  Total Providers: 2   Name [address] MD Sagrario Byrd  [address] [place] Mayuri García  [facility] [address]    PHARMACIES  Total Pharmacies: 1   Name [facility] Phone   [address]  [facility] C  [phone] [facility] [phone]          Diagnoses and all orders for this visit:    Chronic migraine without aura without status migrainosus, not intractable    Class 2 obesity due to excess calories without serious comorbidity with body mass index (BMI) of 37 0 to 37 9 in adult  -     phentermine 37 5 MG capsule; Take 1 capsule (37 5 mg total) by mouth every morning          There are no Patient Instructions on file for this visit  Return in about 1 month (around 1/30/2020)  Subjective:      Patient ID: Peggy Dwyer is a 44 y o  female  Chief Complaint   Patient presents with    Obesity     1 month follow up       She is here today for follow-up for weight management and migraine headache  She has been taking her Topamax 25 mg once daily most of the time  She forgets to take it twice a day  She continues to have headache here and there but it is improving from before  She has been taking her phentermine but she gained 3 lb from the last visit  She stated she did not watch her diet during the holidays and while she was on a vacation at [employer]        The following portions of the patient's history were reviewed and updated as appropriate: allergies, current medications, past family history, past medical history, past social history, past surgical history and problem list     Review of Systems   Constitutional: Negative for chills and fever  HENT: Negative for trouble swallowing  Eyes: Negative for visual disturbance  Respiratory: Negative for cough and shortness of breath  Cardiovascular: Negative for chest pain, palpitations and leg swelling  Gastrointestinal: Negative for abdominal pain, constipation and diarrhea  Endocrine: Negative for cold intolerance and heat intolerance  Genitourinary: Negative for difficulty urinating and dysuria  Musculoskeletal: Negative for gait problem  Skin: Negative for rash  Neurological: Negative for dizziness, tremors, seizures and headaches  Hematological: Negative for adenopathy  Psychiatric/Behavioral: Negative for behavioral problems  Current Outpatient Medications   Medication Sig Dispense Refill    clobetasol (TEMOVATE) 0 05 % GEL       fexofenadine (ALLEGRA) 180 MG tablet Take 180 mg by mouth      montelukast (SINGULAIR) 10 mg tablet Take 1 tablet (10 mg total) by mouth every evening 90 tablet 2    naproxen (NAPROSYN) 500 mg tablet TAKE 1 TABLET TWICE A DAY WITH MEALS 60 tablet 12    omeprazole (PriLOSEC) 40 MG capsule TAKE 1 (ONE) CAPSULE DAILY, 1/2 HOUR BEFORE BREAKFAST  1    phentermine 37 5 MG capsule Take 1 capsule (37 5 mg total) by mouth every morning 30 capsule 0    PREMARIN 0 625 MG tablet       sertraline (ZOLOFT) 50 mg tablet TAKE ONE AND ONE-HALF TABLETS DAILY 135 tablet 4    topiramate (TOPAMAX) 25 mg tablet Take 1 tablet (25 mg total) by mouth 2 (two) times a day 60 tablet 3     No current facility-administered medications for this visit          Objective:    /86 (BP Location: Left arm, Patient Position: Sitting, Cuff Size: Large)   Pulse 91   Temp 98 6 °F (37 °C) (Tympanic)   Resp 16   Ht 5' 2" (1 575 m)   Wt 93 6 kg (206 lb 6 oz)   SpO2 96%   BMI 37 75 kg/m²        Physical Exam   Constitutional: She is oriented to person, place, and time  She appears well-developed and well-nourished  HENT:   Head: Normocephalic and atraumatic  Eyes: Pupils are equal, round, and reactive to light  EOM are normal    Neck: Normal range of motion  Neck supple  Cardiovascular: Normal rate, regular rhythm and normal heart sounds  Pulmonary/Chest: Effort normal and breath sounds normal    Abdominal: Soft  Bowel sounds are normal    Musculoskeletal: Normal range of motion  She exhibits no edema  Lymphadenopathy:     She has no cervical adenopathy  Neurological: She is alert and oriented to person, place, and time  No cranial nerve deficit  Skin: Skin is warm  Psychiatric: She has a normal mood and affect  Nursing note and vitals reviewed               Kathy Dawson MD

## 2019-12-30 NOTE — ASSESSMENT & PLAN NOTE
Not improving  It was discussed about low carb diet and regular exercise  She was given refill of phentermine  It is not better by the next visit I will consider stopping her phentermine

## 2020-02-11 ENCOUNTER — OFFICE VISIT (OUTPATIENT)
Dept: FAMILY MEDICINE CLINIC | Facility: CLINIC | Age: 40
End: 2020-02-11
Payer: COMMERCIAL

## 2020-02-11 VITALS
TEMPERATURE: 98.4 F | OXYGEN SATURATION: 97 % | HEART RATE: 93 BPM | RESPIRATION RATE: 16 BRPM | HEIGHT: 62 IN | BODY MASS INDEX: 37.25 KG/M2 | DIASTOLIC BLOOD PRESSURE: 84 MMHG | SYSTOLIC BLOOD PRESSURE: 134 MMHG | WEIGHT: 202.4 LBS

## 2020-02-11 DIAGNOSIS — F41.9 ANXIETY: ICD-10-CM

## 2020-02-11 DIAGNOSIS — E66.09 CLASS 2 OBESITY DUE TO EXCESS CALORIES WITHOUT SERIOUS COMORBIDITY WITH BODY MASS INDEX (BMI) OF 37.0 TO 37.9 IN ADULT: ICD-10-CM

## 2020-02-11 DIAGNOSIS — E66.9 OBESITY (BMI 30-39.9): Primary | ICD-10-CM

## 2020-02-11 DIAGNOSIS — F32.89 OTHER DEPRESSION: ICD-10-CM

## 2020-02-11 PROCEDURE — 1036F TOBACCO NON-USER: CPT | Performed by: FAMILY MEDICINE

## 2020-02-11 PROCEDURE — 99213 OFFICE O/P EST LOW 20 MIN: CPT | Performed by: FAMILY MEDICINE

## 2020-02-11 RX ORDER — PHENTERMINE HYDROCHLORIDE 37.5 MG/1
37.5 CAPSULE ORAL EVERY MORNING
Qty: 30 CAPSULE | Refills: 0 | Status: SHIPPED | OUTPATIENT
Start: 2020-02-11 | End: 2020-03-10 | Stop reason: SDUPTHER

## 2020-02-11 NOTE — PROGRESS NOTES
Assessment/Plan:  Jony Edwards   Age: 44  Data as of: 02/11/2020    Demographics    Linked Records  Search Criteria            Summary    Summary  Total Prescriptions:    7    Total Prescribers:    2    Total Pharmacies:    1    Narcotics* (excluding Buprenorphine)  Current Qty:   0   Current MME/day:   0 00   30 Day Avg MME/day:   0 00   Buprenorphine*  Current Qty:   0   Current mg/day:   0 00   30 Day Avg mg/day:   0 00   Prescriptions    *Highlighted drugs could not be included in score calculations   Prescriptions  Total Prescriptions: 7    Total Private Pay: 0    Fill Date ID   Written Drug Qty Days Prescriber Rx # Pharmacy Refill   Daily Dose* Pymt Type      12/30/2019  1   12/30/2019  Phentermine 37 5 MG Capsule  30 00 30 Wa Abo   [id number]   Pen (3387)   0   [place]   11/26/2019  1   11/26/2019  Phentermine 37 5 MG Capsule  30 00 30 Wa Abo   [id number]   Pen (3387)   0   [place]   10/18/2019  1   10/18/2019  Phentermine 37 5 MG Capsule  30 00 30 Wa Abo   [id number]   Pen (3387)   0   [place]   09/16/2019  1   09/13/2019  Phentermine 37 5 MG Capsule  30 00 30 Wa Abo   [id number]   Pen (3387)   0   [place]   08/15/2019  1   08/15/2019  Phentermine 37 5 MG Capsule  30 00 30 Wa Abo   [id number]   Pen (3387)   0   [place]   12/12/2018  1   12/12/2018  Oxycodone-Acetaminophen 5-325  20 00 5 Na Mil   [id number]   Pen (3387)   0  30 00 MME  Comm Ins   [place]   02/26/2018  1   02/06/2018  Phentermine 30 MG Capsule  30 00 30 Wa Abo   [id number]   Pen (3387)   0   [place]   *Per CDC guidance, the MME conversion factors prescribed or provided as part of the medication-assisted treatment for opioid use disorder should not be used to benchmark against dosage thresholds meant for opioids prescribed for pain   Buprenorphine products have no agreed upon morphine equivalency, and as partial opioid agonists, are not expected to be associated with overdose risk in the same dose-dependent manner as doses for full agonist opioids  MME = morphine milligram equivalents  LME = Lorazepam milligram equivalents  MG = dose in milligrams  Providers  Total Providers: 2   Name [facility] Phone   MD Constance Cardenas [facility], MD [address] [place] [address]  Total Pharmacies: 1   Name [place] Phone   [address]  [facility], [facility]  [phone] [address]        Obesity (BMI 30-39  9)  Continue current management as patient is still losing weight on the phentermine  Anxiety  Stable  Continue same  Will continue to monitor  Depression  Well controlled  Continue same  Will continue to monitor  Diagnoses and all orders for this visit:    Obesity (BMI 30-39  9)    Class 2 obesity due to excess calories without serious comorbidity with body mass index (BMI) of 37 0 to 37 9 in adult  -     phentermine 37 5 MG capsule; Take 1 capsule (37 5 mg total) by mouth every morning    Other depression    Anxiety    BMI 37 0-37 9, adult        Subjective: See below  Patient ID: Belinda Gan is a 44 y o  female  Patient is here for follow-up as she is being continuously monitored for weight loss on phentermine which she has taken since October 2019  In the last month she is down 3 pounds and believes that the phentermine is still effective  She does not experience any side effects from the medication  Patient is still on the keto diet and is still exercising regularly  She does not have any other concerns about her health today  The following portions of the patient's history were reviewed and updated as appropriate: allergies, current medications, past family history, past medical history, past social history, past surgical history and problem list     Review of Systems   Constitutional: Negative for chills and fever  HENT: Positive for rhinorrhea   Negative for congestion, hearing loss and trouble swallowing  Eyes: Negative for visual disturbance  Respiratory: Negative for cough and shortness of breath  Cardiovascular: Negative for chest pain, palpitations and leg swelling  Gastrointestinal: Negative for abdominal pain, constipation, diarrhea and vomiting  Endocrine: Negative for cold intolerance and heat intolerance  Genitourinary: Negative for difficulty urinating, dysuria and hematuria  Musculoskeletal: Negative for arthralgias, gait problem and myalgias  Skin: Negative for color change and rash  Neurological: Negative for dizziness, tremors, seizures and headaches  Hematological: Negative for adenopathy  Does not bruise/bleed easily  Psychiatric/Behavioral: Positive for agitation (Patient experiences a normal amount of anxiety for herself)  Negative for behavioral problems and dysphoric mood  Objective:    /84 (BP Location: Left arm, Patient Position: Sitting, Cuff Size: Adult)   Pulse 93   Temp 98 4 °F (36 9 °C) (Tympanic)   Resp 16   Ht 5' 2" (1 575 m)   Wt 91 8 kg (202 lb 6 4 oz)   SpO2 97%   BMI 37 02 kg/m²      Physical Exam   Constitutional: She is oriented to person, place, and time  She appears well-developed and well-nourished  HENT:   Head: Normocephalic and atraumatic  Eyes: Pupils are equal, round, and reactive to light  EOM are normal    Neck: Normal range of motion  Neck supple  Cardiovascular: Normal rate, regular rhythm and normal heart sounds  Pulmonary/Chest: Effort normal and breath sounds normal    Abdominal: Soft  Bowel sounds are normal    Musculoskeletal: Normal range of motion  She exhibits no edema  Lymphadenopathy:     She has no cervical adenopathy  Neurological: She is alert and oriented to person, place, and time  No cranial nerve deficit  Skin: Skin is warm  Psychiatric: She has a normal mood and affect  Nursing note and vitals reviewed  BMI Counseling: Body mass index is 37 02 kg/m²   The BMI is above normal  Nutrition recommendations include reducing portion sizes, decreasing overall calorie intake and 3-5 servings of fruits/vegetables daily  Exercise recommendations include moderate aerobic physical activity for 150 minutes/week

## 2020-03-10 ENCOUNTER — OFFICE VISIT (OUTPATIENT)
Dept: FAMILY MEDICINE CLINIC | Facility: CLINIC | Age: 40
End: 2020-03-10
Payer: COMMERCIAL

## 2020-03-10 VITALS
HEART RATE: 85 BPM | OXYGEN SATURATION: 98 % | BODY MASS INDEX: 37.43 KG/M2 | WEIGHT: 203.4 LBS | TEMPERATURE: 98 F | SYSTOLIC BLOOD PRESSURE: 128 MMHG | DIASTOLIC BLOOD PRESSURE: 84 MMHG | RESPIRATION RATE: 16 BRPM | HEIGHT: 62 IN

## 2020-03-10 DIAGNOSIS — E66.09 CLASS 2 OBESITY DUE TO EXCESS CALORIES WITHOUT SERIOUS COMORBIDITY WITH BODY MASS INDEX (BMI) OF 37.0 TO 37.9 IN ADULT: ICD-10-CM

## 2020-03-10 DIAGNOSIS — G43.709 CHRONIC MIGRAINE WITHOUT AURA WITHOUT STATUS MIGRAINOSUS, NOT INTRACTABLE: Primary | ICD-10-CM

## 2020-03-10 PROCEDURE — 1036F TOBACCO NON-USER: CPT | Performed by: FAMILY MEDICINE

## 2020-03-10 PROCEDURE — 99213 OFFICE O/P EST LOW 20 MIN: CPT | Performed by: FAMILY MEDICINE

## 2020-03-10 RX ORDER — PHENTERMINE HYDROCHLORIDE 37.5 MG/1
37.5 CAPSULE ORAL EVERY MORNING
Qty: 30 CAPSULE | Refills: 0 | Status: SHIPPED | OUTPATIENT
Start: 2020-03-10 | End: 2020-04-04 | Stop reason: SDUPTHER

## 2020-03-10 NOTE — PROGRESS NOTES
Assessment/Plan:  Class 2 obesity due to excess calories without serious comorbidity with body mass index (BMI) of 37 0 to 37 9 in adult  It was discussed about low carb diet and regular exercise  She was given prescription for phentermine  She was told this will be the last prescription and then we take a break for 3-4 months  Chronic migraine without aura, not intractable  Well controlled  Continue same  Will continue to monitor    Anderson Galvez   Age: 44  Data as of: 03/10/2020    Demographics    Linked Records                Search Criteria            Summary    Summary  Total Prescriptions:    7    Total Prescribers:    2    Total Pharmacies:    1    Narcotics* (excluding Buprenorphine)  Current Qty:   0   Current MME/day:   0 00   30 Day Avg MME/day:   0 00   Buprenorphine*  Current Qty:   0   Current mg/day:   0 00   30 Day Avg mg/day:   0 00   Prescriptions    *Highlighted drugs could not be included in score calculations   Prescriptions  Total Prescriptions: 7    Total Private Pay: 0    Fill Date ID   Written Drug Qty Days Prescriber Rx # Pharmacy Refill   Daily Dose* Pymt Type      02/11/2020  1   02/11/2020  Phentermine 37 5 MG Capsule  30 00 30 Wa Abo   [id number]   Pen (3387)   0   [place]   12/30/2019  1   12/30/2019  Phentermine 37 5 MG Capsule  30 00 30 Wa Abo   [id number]   Pen (3387)   0   [place]   11/26/2019  1   11/26/2019  Phentermine 37 5 MG Capsule  30 00 30 Wa Abo   [id number]   Pen (3387)   0   Comm Ins   [place]   10/18/2019  1   10/18/2019  Phentermine 37 5 MG Capsule  30 00 30 Wa Abo   [id number]   Pen (3387)   0   [place]   09/16/2019  1   09/13/2019  Phentermine 37 5 MG Capsule  30 00 30 Wa Abo   [id number]   Pen (3387)   0   [place]   08/15/2019  1   08/15/2019  Phentermine 37 5 MG Capsule  30 00 30 Wa Abo   [id number]   Pen (3387)   0   [place]   12/12/2018  1   12/12/2018  Oxycodone-Acetaminophen 5-325  20 00 5 Na Mil   [id number]   Pen (3387)   0  30 00 MME  Comm Ins   [place]   *Per CDC guidance, the MME conversion factors prescribed or provided as part of the medication-assisted treatment for opioid use disorder should not be used to benchmark against dosage thresholds meant for opioids prescribed for pain  Buprenorphine products have no agreed upon morphine equivalency, and as partial opioid agonists, are not expected to be associated with overdose risk in the same dose-dependent manner as doses for full agonist opioids  MME = morphine milligram equivalents  LME = Lorazepam milligram equivalents  MG = dose in milligrams  Diagnoses and all orders for this visit:    Chronic migraine without aura without status migrainosus, not intractable    Class 2 obesity due to excess calories without serious comorbidity with body mass index (BMI) of 37 0 to 37 9 in adult  -     phentermine 37 5 MG capsule; Take 1 capsule (37 5 mg total) by mouth every morning          There are no Patient Instructions on file for this visit  Return in about 1 month (around 4/10/2020)  Subjective:      Patient ID: Kadi Araujo is a 44 y o  female  Chief Complaint   Patient presents with    Obesity     1 month follow up       She is here today for weight management  She has been taking phentermine and Topamax  She denies any side effects from the medications  She gained 1 lb since the last visit  She stated she has been watching her diet but she has been exercising  The following portions of the patient's history were reviewed and updated as appropriate: allergies, current medications, past family history, past medical history, past social history, past surgical history and problem list     Review of Systems   Constitutional: Negative for chills and fever  HENT: Negative for trouble swallowing  Eyes: Negative for visual disturbance  Respiratory: Negative for cough and shortness of breath  Cardiovascular: Negative for chest pain, palpitations and leg swelling  Gastrointestinal: Negative for abdominal pain, constipation and diarrhea  Endocrine: Negative for cold intolerance and heat intolerance  Genitourinary: Negative for difficulty urinating and dysuria  Musculoskeletal: Negative for gait problem  Skin: Negative for rash  Neurological: Negative for dizziness, tremors, seizures and headaches  Hematological: Negative for adenopathy  Psychiatric/Behavioral: Negative for behavioral problems  Current Outpatient Medications   Medication Sig Dispense Refill    fexofenadine (ALLEGRA) 180 MG tablet Take 180 mg by mouth      montelukast (SINGULAIR) 10 mg tablet Take 1 tablet (10 mg total) by mouth every evening 90 tablet 2    naproxen (NAPROSYN) 500 mg tablet TAKE 1 TABLET TWICE A DAY WITH MEALS 60 tablet 12    omeprazole (PriLOSEC) 40 MG capsule TAKE 1 (ONE) CAPSULE DAILY, 1/2 HOUR BEFORE BREAKFAST  1    phentermine 37 5 MG capsule Take 1 capsule (37 5 mg total) by mouth every morning 30 capsule 0    PREMARIN 0 625 MG tablet       sertraline (ZOLOFT) 50 mg tablet TAKE ONE AND ONE-HALF TABLETS DAILY 135 tablet 4    topiramate (TOPAMAX) 25 mg tablet Take 1 tablet (25 mg total) by mouth 2 (two) times a day 60 tablet 3     No current facility-administered medications for this visit  Objective:    /84 (BP Location: Left arm, Patient Position: Sitting, Cuff Size: Large)   Pulse 85   Temp 98 °F (36 7 °C) (Tympanic)   Resp 16   Ht 5' 2" (1 575 m)   Wt 92 3 kg (203 lb 6 4 oz)   SpO2 98%   BMI 37 20 kg/m²        Physical Exam   Constitutional: She is oriented to person, place, and time  She appears well-developed and well-nourished  HENT:   Head: Normocephalic and atraumatic  Eyes: Pupils are equal, round, and reactive to light  EOM are normal    Neck: Normal range of motion  Neck supple  Cardiovascular: Normal rate, regular rhythm and normal heart sounds  Pulmonary/Chest: Effort normal and breath sounds normal    Abdominal: Soft  Bowel sounds are normal    Musculoskeletal: Normal range of motion  She exhibits no edema  Lymphadenopathy:     She has no cervical adenopathy  Neurological: She is alert and oriented to person, place, and time  No cranial nerve deficit  Skin: Skin is warm  Psychiatric: She has a normal mood and affect  Nursing note and vitals reviewed               Maik Berry MD

## 2020-03-10 NOTE — ASSESSMENT & PLAN NOTE
It was discussed about low carb diet and regular exercise  She was given prescription for phentermine  She was told this will be the last prescription and then we take a break for 3-4 months

## 2020-04-03 ENCOUNTER — TELEMEDICINE (OUTPATIENT)
Dept: FAMILY MEDICINE CLINIC | Facility: CLINIC | Age: 40
End: 2020-04-03
Payer: COMMERCIAL

## 2020-04-03 DIAGNOSIS — E66.09 CLASS 2 OBESITY DUE TO EXCESS CALORIES WITHOUT SERIOUS COMORBIDITY WITH BODY MASS INDEX (BMI) OF 36.0 TO 36.9 IN ADULT: ICD-10-CM

## 2020-04-03 DIAGNOSIS — E66.09 CLASS 2 OBESITY DUE TO EXCESS CALORIES WITHOUT SERIOUS COMORBIDITY WITH BODY MASS INDEX (BMI) OF 37.0 TO 37.9 IN ADULT: ICD-10-CM

## 2020-04-03 PROCEDURE — 99214 OFFICE O/P EST MOD 30 MIN: CPT | Performed by: FAMILY MEDICINE

## 2020-04-03 PROCEDURE — 3008F BODY MASS INDEX DOCD: CPT | Performed by: FAMILY MEDICINE

## 2020-04-04 VITALS
BODY MASS INDEX: 36.8 KG/M2 | SYSTOLIC BLOOD PRESSURE: 130 MMHG | DIASTOLIC BLOOD PRESSURE: 80 MMHG | WEIGHT: 200 LBS | HEIGHT: 62 IN

## 2020-04-04 RX ORDER — PHENTERMINE HYDROCHLORIDE 37.5 MG/1
37.5 CAPSULE ORAL EVERY MORNING
Qty: 30 CAPSULE | Refills: 0 | Status: SHIPPED | OUTPATIENT
Start: 2020-04-04

## 2020-04-18 DIAGNOSIS — J30.9 ALLERGIC RHINITIS, UNSPECIFIED SEASONALITY, UNSPECIFIED TRIGGER: ICD-10-CM

## 2020-04-19 RX ORDER — MONTELUKAST SODIUM 10 MG/1
TABLET ORAL
Qty: 90 TABLET | Refills: 3 | Status: SHIPPED | OUTPATIENT
Start: 2020-04-19 | End: 2021-04-13

## 2021-01-04 DIAGNOSIS — M25.50 ARTHRALGIA, UNSPECIFIED JOINT: ICD-10-CM

## 2021-01-04 RX ORDER — NAPROXEN 500 MG/1
TABLET ORAL
Qty: 60 TABLET | Refills: 0 | Status: SHIPPED | OUTPATIENT
Start: 2021-01-04 | End: 2021-01-15 | Stop reason: SDUPTHER

## 2021-01-15 ENCOUNTER — OFFICE VISIT (OUTPATIENT)
Dept: FAMILY MEDICINE CLINIC | Facility: CLINIC | Age: 41
End: 2021-01-15
Payer: COMMERCIAL

## 2021-01-15 VITALS
BODY MASS INDEX: 41.96 KG/M2 | TEMPERATURE: 98.6 F | SYSTOLIC BLOOD PRESSURE: 138 MMHG | OXYGEN SATURATION: 95 % | RESPIRATION RATE: 18 BRPM | HEART RATE: 98 BPM | DIASTOLIC BLOOD PRESSURE: 82 MMHG | WEIGHT: 228 LBS | HEIGHT: 62 IN

## 2021-01-15 DIAGNOSIS — Z12.31 ENCOUNTER FOR SCREENING MAMMOGRAM FOR MALIGNANT NEOPLASM OF BREAST: ICD-10-CM

## 2021-01-15 DIAGNOSIS — Z00.00 HEALTHCARE MAINTENANCE: Primary | ICD-10-CM

## 2021-01-15 DIAGNOSIS — M25.50 ARTHRALGIA, UNSPECIFIED JOINT: ICD-10-CM

## 2021-01-15 DIAGNOSIS — E78.49 OTHER HYPERLIPIDEMIA: ICD-10-CM

## 2021-01-15 DIAGNOSIS — Z11.52 ENCOUNTER FOR SCREENING FOR COVID-19: ICD-10-CM

## 2021-01-15 DIAGNOSIS — E66.01 MORBID OBESITY WITH BMI OF 40.0-44.9, ADULT (HCC): ICD-10-CM

## 2021-01-15 PROCEDURE — 3008F BODY MASS INDEX DOCD: CPT | Performed by: FAMILY MEDICINE

## 2021-01-15 PROCEDURE — 99396 PREV VISIT EST AGE 40-64: CPT | Performed by: FAMILY MEDICINE

## 2021-01-15 PROCEDURE — 3725F SCREEN DEPRESSION PERFORMED: CPT | Performed by: FAMILY MEDICINE

## 2021-01-15 PROCEDURE — 1036F TOBACCO NON-USER: CPT | Performed by: FAMILY MEDICINE

## 2021-01-15 RX ORDER — ALBUTEROL SULFATE 90 UG/1
2 AEROSOL, METERED RESPIRATORY (INHALATION) EVERY 6 HOURS PRN
COMMUNITY
Start: 2020-12-19 | End: 2022-02-21 | Stop reason: SDUPTHER

## 2021-01-15 RX ORDER — NAPROXEN 500 MG/1
500 TABLET ORAL 2 TIMES DAILY WITH MEALS
Qty: 60 TABLET | Refills: 2 | Status: SHIPPED | OUTPATIENT
Start: 2021-01-15 | End: 2022-04-27

## 2021-01-15 NOTE — ASSESSMENT & PLAN NOTE
Was discussed with patient about the limited benefit of getting the antibody test but she insisted on getting the test

## 2021-01-15 NOTE — PROGRESS NOTES
Assessment/Plan:  Healthcare maintenance    It was discussed about immunizations, diet, exercise and safety measures  Encounter for screening for COVID-19    Was discussed with patient about the limited benefit of getting the antibody test but she insisted on getting the test        Diagnoses and all orders for this visit:    Healthcare maintenance  -     CBC and differential; Future  -     Comprehensive metabolic panel; Future  -     Lipid Panel with Direct LDL reflex; Future  -     TSH, 3rd generation with Free T4 reflex; Future    Arthralgia, unspecified joint  Comments:  She was given refills for naproxen  She is to continue to follow up with chiropractor  Orders:  -     naproxen (NAPROSYN) 500 mg tablet; Take 1 tablet (500 mg total) by mouth 2 (two) times a day with meals    Encounter for screening for COVID-19  -     SARS-CoV2 Antibody, Total (IgG, IgA, IgM) [facility]; Future    Other hyperlipidemia  -     Lipid Panel with Direct LDL reflex; Future    Encounter for screening mammogram for malignant neoplasm of breast    Morbid obesity with BMI of 40 0-44 9, adult ([facility] )    Other orders  -     albuterol (PROVENTIL HFA,VENTOLIN HFA) 90 mcg/act inhaler; Inhale 2 puffs every 6 (six) hours as needed  -     Cancel: Mammo screening bilateral w 3d & cad; Future          There are no Patient Instructions on file for this visit  Return in about 6 months (around 7/15/2021)  Subjective:      Patient ID: Esperanza Walton is a 36 y o  female  Chief Complaint   Patient presents with    Follow-up     med refill        She is here today for wellness exam   She denies any complain  She was sick recently with upper respiratory and bronchitis  On was seen at care now  She had COVID test was negative  She is requesting COVID antibody test to see if it was a false negative        The following portions of the patient's history were reviewed and updated as appropriate: allergies, current medications, past family history, past medical history, past social history, past surgical history and problem list     Review of Systems   Constitutional: Negative for chills and fever  HENT: Negative for trouble swallowing  Eyes: Negative for visual disturbance  Respiratory: Negative for cough and shortness of breath  Cardiovascular: Negative for chest pain, palpitations and leg swelling  Gastrointestinal: Negative for abdominal pain, constipation and diarrhea  Endocrine: Negative for cold intolerance and heat intolerance  Genitourinary: Negative for difficulty urinating and dysuria  Musculoskeletal: Negative for gait problem  Skin: Negative for rash  Neurological: Negative for dizziness, tremors, seizures and headaches  Hematological: Negative for adenopathy  Psychiatric/Behavioral: Negative for behavioral problems  Current Outpatient Medications   Medication Sig Dispense Refill    albuterol (PROVENTIL HFA,VENTOLIN HFA) 90 mcg/act inhaler Inhale 2 puffs every 6 (six) hours as needed      fexofenadine (ALLEGRA) 180 MG tablet Take 180 mg by mouth      montelukast (SINGULAIR) 10 mg tablet TAKE 1 TABLET EVERY EVENING 90 tablet 3    naproxen (NAPROSYN) 500 mg tablet Take 1 tablet (500 mg total) by mouth 2 (two) times a day with meals 60 tablet 2    omeprazole (PriLOSEC) 40 MG capsule TAKE 1 (ONE) CAPSULE DAILY, 1/2 HOUR BEFORE BREAKFAST  1    PREMARIN 0 625 MG tablet       sertraline (ZOLOFT) 50 mg tablet TAKE ONE AND ONE-HALF TABLETS DAILY 135 tablet 4    phentermine 37 5 MG capsule Take 1 capsule (37 5 mg total) by mouth every morning (Patient not taking: Reported on 1/15/2021) 30 capsule 0    topiramate (TOPAMAX) 25 mg tablet Take 1 tablet (25 mg total) by mouth 2 (two) times a day (Patient not taking: Reported on 1/15/2021) 60 tablet 3     No current facility-administered medications for this visit          Objective:    /82   Pulse 98   Temp 98 6 °F (37 °C)   Resp 18   Ht 5' 2" (1 575 m)   Wt 103 kg (228 lb)   SpO2 95%   BMI 41 70 kg/m²        Physical Exam  Vitals signs and nursing note reviewed  Constitutional:       Appearance: She is well-developed  HENT:      Head: Normocephalic and atraumatic  Eyes:      Pupils: Pupils are equal, round, and reactive to light  Neck:      Musculoskeletal: Normal range of motion and neck supple  Cardiovascular:      Rate and Rhythm: Normal rate and regular rhythm  Heart sounds: Normal heart sounds  Pulmonary:      Effort: Pulmonary effort is normal       Breath sounds: Normal breath sounds  Abdominal:      General: Bowel sounds are normal       Palpations: Abdomen is soft  Musculoskeletal: Normal range of motion  Lymphadenopathy:      Cervical: No cervical adenopathy  Skin:     General: Skin is warm  Neurological:      Mental Status: She is alert and oriented to person, place, and time  Cranial Nerves: No cranial nerve deficit  Joe Mccrary MD  BMI Counseling: Body mass index is 41 7 kg/m²  The BMI is above normal  Nutrition recommendations include reducing portion sizes, decreasing overall calorie intake and 3-5 servings of fruits/vegetables daily  Exercise recommendations include moderate aerobic physical activity for 150 minutes/week

## 2021-01-16 LAB — EXTERNAL SARS COV-2 ANTIBODY IGG: NEGATIVE

## 2021-01-26 ENCOUNTER — TELEPHONE (OUTPATIENT)
Dept: FAMILY MEDICINE CLINIC | Facility: CLINIC | Age: 41
End: 2021-01-26

## 2021-01-26 NOTE — TELEPHONE ENCOUNTER
----- Message from [id number] sent at 1/25/2021 11:40 AM EST -----  - Cholesterol slightly elevated  Recommend low carb, low fat diet and regular exercise  - Rest of labs stable

## 2021-02-19 DIAGNOSIS — F41.9 ANXIETY: ICD-10-CM

## 2021-03-31 DIAGNOSIS — Z23 ENCOUNTER FOR IMMUNIZATION: ICD-10-CM

## 2021-04-12 ENCOUNTER — TELEPHONE (OUTPATIENT)
Dept: ADMINISTRATIVE | Facility: OTHER | Age: 41
End: 2021-04-12

## 2021-04-12 NOTE — TELEPHONE ENCOUNTER
----- Message from Nevin Scherer sent at 4/12/2021  9:03 AM EDT -----  Regarding: Mammo  04/12/21 9:03 AM    Hello, our patient Jailyn Logan has had Mammogram completed/performed  Please assist in updating the patient chart by pulling the document from Encounters Tab within Chart Review   The date of service is  4/9/21    Thank you,  Nevin Scherer   [address]

## 2021-04-12 NOTE — TELEPHONE ENCOUNTER
Upon review of the In Basket request we were able to locate, review, and update the patient chart as requested for Mammogram     Any additional questions or concerns should be emailed to the Practice Liaisons via [email or web address] email, please do not reply via In Basket      Thank you  Baudilio Weber

## 2021-04-13 DIAGNOSIS — J30.9 ALLERGIC RHINITIS, UNSPECIFIED SEASONALITY, UNSPECIFIED TRIGGER: ICD-10-CM

## 2021-04-13 RX ORDER — MONTELUKAST SODIUM 10 MG/1
TABLET ORAL
Qty: 90 TABLET | Refills: 3 | Status: SHIPPED | OUTPATIENT
Start: 2021-04-13 | End: 2022-04-08

## 2021-04-20 ENCOUNTER — TELEPHONE (OUTPATIENT)
Dept: FAMILY MEDICINE CLINIC | Facility: CLINIC | Age: 41
End: 2021-04-20

## 2021-04-20 DIAGNOSIS — F41.9 ANXIETY: Primary | ICD-10-CM

## 2021-04-20 RX ORDER — LORAZEPAM 0.5 MG/1
0.5 TABLET ORAL DAILY PRN
Qty: 20 TABLET | Refills: 0 | Status: SHIPPED | OUTPATIENT
Start: 2021-04-20

## 2021-04-20 RX ORDER — LORAZEPAM 0.5 MG/1
0.5 TABLET ORAL DAILY PRN
COMMUNITY
Start: 2017-10-09 | End: 2021-04-20 | Stop reason: SDUPTHER

## 2021-04-20 NOTE — TELEPHONE ENCOUNTER
----- Message from Margarito Ramirez sent at 4/19/2021  5:43 PM EDT -----  Regarding: Prescription Question  Contact: [phone]  I just used my last lorazepam  5mg  Then realized the ex was from 2017  When looking to get a refill I do not see it in my chart  I was wondering if I could get a refill of this please  I do not use them very often but took my last one today

## 2021-04-20 NOTE — TELEPHONE ENCOUNTER
Pt was last seen for routine visit on 1/15/21  I looked through her current and past med list and was unable to locate that pt was on lorazepam  I did check pdmp web site and she had it last filled 17  Did you want her to be seen? She does have a history of anxiety   Please advise    Report Prepared: 2021   Date Range: 2017 - 2018       Download PDF      Download CSV    Brianna Arenas     Linked Records  Name  ID Gender Address   Leda Hyde 1980 1 female [address] [facility] [address]   Report Criteria  First Nametina  Last Alex Jimenez  [id number]/  Summary      Summary  Total Prescriptions   7   Total Private Pay   0   Total Prescribers   2   Total Pharmacies   1    Opioids* (excluding buprenorphine)  Current Qty   0 0   Current MME/day   0 0   30 Day Avg MME/day   0 0    Buprenorphine*  Current Qty   0 0   Current mg/day   0 0   30 Day Avg mg/day   0 0    Prescriptions    Filled  ID  Written  Drug  QTY  Days  Prescriber  Rx #  Pharmacy *  Refills  Daily Dose  Pymt Type      2018  1  2018  PHENTERMINE 30 MG CAPSULE  30 0  30  WA ABO  [id number]  PENNS (3387)  [place]   [place]    2018  1  2018  PHENTERMINE 30 MG CAPSULE  30 0  30  WA ABO  [id number]  PENNS (3387)  0   [place]    2017  1  2017  PHENTERMINE 30 MG CAPSULE  30 0  30  WA ABO  [id number]  PENNS (3387)  0   [place]    2017  1  11/10/2017  TRAMADOL HCL 50 MG TABLET  30 0  30  JI   [id number]  PENNS (3387)  0  5 0 MME  [place]    2017  1  2017  PHENTERMINE 30 MG CAPSULE  30 0  30  WA ABO  [id number]  PENNS (3387)  0   [place]    10/10/2017  1  10/10/2017  PHENTERMINE 30 MG CAPSULE  30 0  30  WA ABO  [id number]  PENNS (3387)  0   [place]    10/09/2017  1  2017  LORAZEPAM 0 5 MG TABLET  30 0  30  WA ABO  [id number]  PENNS (3387)  0   [place] Ins  [place]    *Pharmacy is created using a combination of pharmacy name and the last four digits of the pharmacy license number  *Per CDC guidance, the MME conversion factors prescribed or provided as part of medication-assisted treatment for opioid use disorder should not be used to benchmark against dosage thresholds meant for opioids prescribed for pain  Buprenorphine products have no agreed upon morphine equivalency, and as partial opioid agonists, are not expected to be associated with overdose risk in the same dose-dependent manner as doses for full agonist opioids  MME = morphine milligram equivalents  mg = dose in milligrams  Prescribers    Name  Frantz  [address]  Zip  Phone    [facility]  ROS[address]  Zip  Phone    [facility], [place]  [phone] [email or web address]  [phone]    ×   Contested Record Flag    Prescriber Delegate - Unlicensed Disclaimer:  Information from the [address] is protected health information and any information accessed must be treated as confidential  Any person who unintentionally or intentionally makes an unauthorized disclosure of information from the [address] will be subject to civil and criminal penalties as set forth in the ABC-MAP Act 2014-191, Act of Oct  27, 2014, P L  5533  The information in the [address] may contain errors resulting from the reporting of information received  Additional independent verification of patient profile information with pharmacies and prescribers may sometimes be prudent or necessary

## 2021-04-29 ENCOUNTER — OFFICE VISIT (OUTPATIENT)
Dept: URGENT CARE | Age: 41
End: 2021-04-29
Payer: COMMERCIAL

## 2021-04-29 VITALS
WEIGHT: 225.4 LBS | RESPIRATION RATE: 20 BRPM | HEART RATE: 109 BPM | OXYGEN SATURATION: 96 % | BODY MASS INDEX: 41.48 KG/M2 | HEIGHT: 62 IN | DIASTOLIC BLOOD PRESSURE: 94 MMHG | SYSTOLIC BLOOD PRESSURE: 154 MMHG | TEMPERATURE: 97.8 F

## 2021-04-29 DIAGNOSIS — F41.9 ANXIETY: Primary | ICD-10-CM

## 2021-04-29 PROCEDURE — 99213 OFFICE O/P EST LOW 20 MIN: CPT | Performed by: NURSE PRACTITIONER

## 2021-04-29 NOTE — PROGRESS NOTES
NAME: Bhumika Mcguire is a 39 y o  female  : 1980    MRN: [id number]    /94   Pulse (!) 109   Temp 97 8 °F (36 6 °C)   Resp 20   Ht 5' 2" (1 575 m)   Wt 102 kg (225 lb 6 4 oz)   SpO2 96%   BMI 41 23 kg/m²     Assessment and Plan   Anxiety [F41 9]  1  Anxiety         Kendrick Owens was seen today for anxiety  Diagnoses and all orders for this visit:    Anxiety        Patient Instructions   Patient Instructions     Continue seeing your therapist and taking medications as directed  Follow up with pcp  If worse go to the ER  Work note provided for her today    Anxiety   WHAT YOU NEED TO KNOW:   Anxiety is a condition that causes you to feel extremely worried or nervous  The feelings are so strong that they can cause problems with your daily activities or sleep  Anxiety may be triggered by something you fear, or it may happen without a cause  Family or work stress, smoking, caffeine, and alcohol can increase your risk for anxiety  Certain medicines or health conditions can also increase your risk  Anxiety can become a long-term condition if it is not managed or treated  DISCHARGE INSTRUCTIONS:   Call your local emergency number (911 in the [address] [facility]) if:   · You have chest pain, tightness, or heaviness that may spread to your shoulders, arms, jaw, neck, or back  · You feel like hurting yourself or someone else  Call your doctor if:   · Your symptoms get worse or do not get better with treatment  · Your anxiety keeps you from doing your regular daily activities  · You have new symptoms since your last visit  · You have questions or concerns about your condition or care  Medicines:   · Medicines  may be given to help you feel more calm and relaxed, and decrease your symptoms  · Take your medicine as directed  Contact your healthcare provider if you think your medicine is not helping or if you have side effects  Tell him of her if you are allergic to any medicine   Keep a list of the medicines, vitamins, and herbs you take  Include the amounts, and when and why you take them  Bring the list or the pill bottles to follow-up visits  Carry your medicine list with you in case of an emergency  Manage anxiety:   · Talk to someone about your anxiety  Your healthcare provider may suggest counseling  Cognitive behavioral therapy can help you understand and change how you react to events that trigger your symptoms  You might feel more comfortable talking with a friend or family member about your anxiety  Choose someone you know will be supportive and encouraging  · Find ways to relax  Activities such as exercise, meditation, or listening to music can help you relax  Spend time with friends, or do things you enjoy  · Practice deep breathing  Deep breathing can help you relax when you feel anxious  Focus on taking slow, deep breaths several times a day, or during an anxiety attack  Breathe in through your nose and out through your mouth  · Create a regular sleep routine  Regular sleep can help you feel calmer during the day  Go to sleep and wake up at the same times every day  Do not watch television or use the computer right before bed  Your room should be comfortable, dark, and quiet  · Eat a variety of healthy foods  Healthy foods include fruits, vegetables, low-fat dairy products, lean meats, fish, whole-grain breads, and cooked beans  Healthy foods can help you feel less anxious and have more energy  · Exercise regularly  Exercise can increase your energy level  Exercise may also lift your mood and help you sleep better  Your healthcare provider can help you create an exercise plan  · Do not smoke  Nicotine and other chemicals in cigarettes and cigars can increase anxiety  Ask your healthcare provider for information if you currently smoke and need help to quit  E-cigarettes or smokeless tobacco still contain nicotine   Talk to your healthcare provider before you use these products  · Do not have caffeine  Caffeine can make your symptoms worse  Do not have foods or drinks that are meant to increase your energy level  · Limit or do not drink alcohol  Ask your healthcare provider if alcohol is safe for you  You may not be able to drink alcohol if you take certain anxiety or depression medicines  Limit alcohol to 1 drink per day if you are a woman  Limit alcohol to 2 drinks per day if you are a man  A drink of alcohol is 12 ounces of beer, 5 ounces of wine, or 1½ ounces of liquor  · Do not use drugs  Drugs can make your anxiety worse  It can also make anxiety hard to manage  Talk to your healthcare provider if you use drugs and want help to quit  Follow up with your doctor within 2 weeks or as directed:  Write down your questions so you remember to ask them during your visits  © Copyright [address] Information is for End User's use only and may not be sold, redistributed or otherwise used for commercial purposes  All illustrations and images included in CareNotes® are the copyrighted property of A D A M , Inc  or [address] [place] [address]  The above information is an  only  It is not intended as medical advice for individual conditions or treatments  Talk to your doctor, nurse or pharmacist before following any medical regimen to see if it is safe and effective for you  Proceed to the nearest ER if symptoms worsen, Follow up with your PCP  Continue to social distance, wash your hands, and wear your masks  Please continue to follow the CDC  gov guidelines daily for they are subject to change on COVID-19    Chief Complaint     Chief Complaint   Patient presents with    Anxiety     Pt relates she has had increased stress at work causing anxiety and depression x two weeks  Presently taking Zoloft and ATivan RX'd by PCP  Pt also sees a therapist weekly  Denies suidical ideation  History of Present Illness     [place] is a 39 y o  female who presents to the urgent care today for increased anxiety over the last week  She notes significantly increased 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, 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  She specifically details an incident from earlier this week where she was chatting with a coworker about bringing in a dog that she is training to be a therapy dog to visit the school and she made a joke that if nothing else, she could use the dog to be her emotional support animal  The coworker reported this comment to the principal and as a result, the pt  Was called into a meeting with the principal and a note was written up and placed in her file  She is feeling "bullied" and "attacked" at work, and notes no increased stress in her home life apart from not wanting to go into work daily  She denies any auditory or visual hallucinations  She is currently on Zoloft and Ativan prescribed by her PCP  She takes Zoloft daily as prescribed, and recently ran out of her last 30-day supply of Ativan, which she states she has had for 3 years, but has been taking more frequently due to this increased work stress  She was given a refill of 15 tabs and has taken 3 within the last week  She states that she does not take Ativan while at work, but also notes that she will take it if she is feeling anxious because of stress at work  She denies having SI and HI  Pt is very tearful during HPI    Anxiety  Symptoms include nervous/anxious behavior  Review of Systems   Review of Systems   HENT: Positive for rhinorrhea (tearful and crying)  Skin: Negative  Psychiatric/Behavioral: Negative for hallucinations  The patient is nervous/anxious            Current Medications       Current Outpatient Medications:     albuterol (PROVENTIL HFA,VENTOLIN HFA) 90 mcg/act inhaler, Inhale 2 puffs every 6 (six) hours as needed, Disp: , Rfl:     fexofenadine (ALLEGRA) 180 MG tablet, Take 180 mg by mouth, Disp: , Rfl:     LORazepam (ATIVAN) 0 5 mg tablet, Take 1 tablet (0 5 mg total) by mouth daily as needed for anxiety, Disp: 20 tablet, Rfl: 0    montelukast (SINGULAIR) 10 mg tablet, TAKE 1 TABLET EVERY EVENING, Disp: 90 tablet, Rfl: 3    naproxen (NAPROSYN) 500 mg tablet, Take 1 tablet (500 mg total) by mouth 2 (two) times a day with meals, Disp: 60 tablet, Rfl: 2    omeprazole (PriLOSEC) 40 MG capsule, TAKE 1 (ONE) CAPSULE DAILY, 1/2 HOUR BEFORE BREAKFAST, Disp: , Rfl: 1    PREMARIN 0 625 MG tablet, , Disp: , Rfl:     sertraline (ZOLOFT) 50 mg tablet, TAKE ONE AND ONE-HALF TABLETS DAILY, Disp: 135 tablet, Rfl: 3    phentermine 37 5 MG capsule, Take 1 capsule (37 5 mg total) by mouth every morning (Patient not taking: Reported on 1/15/2021), Disp: 30 capsule, Rfl: 0    topiramate (TOPAMAX) 25 mg tablet, Take 1 tablet (25 mg total) by mouth 2 (two) times a day (Patient not taking: Reported on 1/15/2021), Disp: 60 tablet, Rfl: 3    Current Allergies     Allergies as of 04/29/2021 - Reviewed 04/29/2021   Allergen Reaction Noted    Fluticasone  04/09/2018    Pollen extract  07/07/2014              Past Medical History:   Diagnosis Date    Allergy     Asthma        Past Surgical History:   Procedure Laterality Date    REDUCTION MAMMAPLASTY Bilateral 12/2018    TONSILLECTOMY      TOTAL ABDOMINAL HYSTERECTOMY      onset: 7/15/14       Family History   Problem Relation Age of Onset    Alcohol abuse Mother     Anemia Mother     Diverticulitis Mother     Alcohol abuse Father     Colon cancer Father 43    Pancreatic cancer Father     Hypertension Father     Other Father 54        allen syndrome    Breast cancer Maternal Grandmother     Prostate cancer Maternal Grandfather     Diabetes type II Family     Cerebral aneurysm Family          Medications have been verified      The following portions of the patient's history were reviewed and updated as appropriate: allergies, current medications, past family history, past medical history, past social history, past surgical history and problem list     Objective   /94   Pulse (!) 109   Temp 97 8 °F (36 6 °C)   Resp 20   Ht 5' 2" (1 575 m)   Wt 102 kg (225 lb 6 4 oz)   SpO2 96%   BMI 41 23 kg/m²      Physical Exam     Physical Exam  Constitutional:       General: She is in acute distress (appears stressed with tearful affect)  Appearance: She is not ill-appearing or diaphoretic  HENT:      Head: Normocephalic and atraumatic  Right Ear: External ear normal       Left Ear: External ear normal       Nose: Nose normal    Cardiovascular:      Rate and Rhythm: Regular rhythm  Tachycardia present  Heart sounds: Normal heart sounds  No murmur  No friction rub  No gallop  Pulmonary:      Effort: Pulmonary effort is normal  No respiratory distress  Breath sounds: Normal breath sounds  No stridor  No wheezing, rhonchi or rales  Musculoskeletal: Normal range of motion  Skin:     General: Skin is warm and dry  Findings: No lesion or rash  Neurological:      General: No focal deficit present  Mental Status: She is oriented to person, place, and time  Psychiatric:         Attention and Perception: Attention and perception normal  She does not perceive auditory or visual hallucinations  Mood and Affect: Mood is anxious  Affect is tearful  Speech: Speech normal          Behavior: Behavior normal  Behavior is cooperative  Thought Content: Thought content normal          Cognition and Memory: Memory normal          Judgment: Judgment is inappropriate (would rather not go into work than speak with coworkers to solve problems she is experiencing  Currently seeing a therapist weekly)  Denies HI and SI  Note: Portions of this record may have been created with voice recognition software  Occasional wrong word or "sound a like" substitutions may have occurred due to the inherent limitations of voice recognition software   Please read the chart carefully and recognize, using context, where substitutions have occurred  CARMINE Uribe

## 2021-04-29 NOTE — PATIENT INSTRUCTIONS
Continue seeing your therapist and taking medications as directed  Follow up with pcp  If worse go to the ER  Work note provided for her today    Anxiety   WHAT YOU NEED TO KNOW:   Anxiety is a condition that causes you to feel extremely worried or nervous  The feelings are so strong that they can cause problems with your daily activities or sleep  Anxiety may be triggered by something you fear, or it may happen without a cause  Family or work stress, smoking, caffeine, and alcohol can increase your risk for anxiety  Certain medicines or health conditions can also increase your risk  Anxiety can become a long-term condition if it is not managed or treated  DISCHARGE INSTRUCTIONS:   Call your local emergency number (911 in the [address]) if:   · You have chest pain, tightness, or heaviness that may spread to your shoulders, arms, jaw, neck, or back  · You feel like hurting yourself or someone else  Call your doctor if:   · Your symptoms get worse or do not get better with treatment  · Your anxiety keeps you from doing your regular daily activities  · You have new symptoms since your last visit  · You have questions or concerns about your condition or care  Medicines:   · Medicines  may be given to help you feel more calm and relaxed, and decrease your symptoms  · Take your medicine as directed  Contact your healthcare provider if you think your medicine is not helping or if you have side effects  Tell him of her if you are allergic to any medicine  Keep a list of the medicines, vitamins, and herbs you take  Include the amounts, and when and why you take them  Bring the list or the pill bottles to follow-up visits  Carry your medicine list with you in case of an emergency  Manage anxiety:   · Talk to someone about your anxiety  Your healthcare provider may suggest counseling  Cognitive behavioral therapy can help you understand and change how you react to events that trigger your symptoms   You might feel more comfortable talking with a friend or family member about your anxiety  Choose someone you know will be supportive and encouraging  · Find ways to relax  Activities such as exercise, meditation, or listening to music can help you relax  Spend time with friends, or do things you enjoy  · Practice deep breathing  Deep breathing can help you relax when you feel anxious  Focus on taking slow, deep breaths several times a day, or during an anxiety attack  Breathe in through your nose and out through your mouth  · Create a regular sleep routine  Regular sleep can help you feel calmer during the day  Go to sleep and wake up at the same times every day  Do not watch television or use the computer right before bed  Your room should be comfortable, dark, and quiet  · Eat a variety of healthy foods  Healthy foods include fruits, vegetables, low-fat dairy products, lean meats, fish, whole-grain breads, and cooked beans  Healthy foods can help you feel less anxious and have more energy  · Exercise regularly  Exercise can increase your energy level  Exercise may also lift your mood and help you sleep better  Your healthcare provider can help you create an exercise plan  · Do not smoke  Nicotine and other chemicals in cigarettes and cigars can increase anxiety  Ask your healthcare provider for information if you currently smoke and need help to quit  E-cigarettes or smokeless tobacco still contain nicotine  Talk to your healthcare provider before you use these products  · Do not have caffeine  Caffeine can make your symptoms worse  Do not have foods or drinks that are meant to increase your energy level  · Limit or do not drink alcohol  Ask your healthcare provider if alcohol is safe for you  You may not be able to drink alcohol if you take certain anxiety or depression medicines  Limit alcohol to 1 drink per day if you are a woman   Limit alcohol to 2 drinks per day if you are a man  A drink of alcohol is 12 ounces of beer, 5 ounces of wine, or 1½ ounces of liquor  · Do not use drugs  Drugs can make your anxiety worse  It can also make anxiety hard to manage  Talk to your healthcare provider if you use drugs and want help to quit  Follow up with your doctor within 2 weeks or as directed:  Write down your questions so you remember to ask them during your visits  © Copyright [address] Information is for End User's use only and may not be sold, redistributed or otherwise used for commercial purposes  All illustrations and images included in CareNotes® are the copyrighted property of A D A M , Inc  or [address] [place] [address]  The above information is an  only  It is not intended as medical advice for individual conditions or treatments  Talk to your doctor, nurse or pharmacist before following any medical regimen to see if it is safe and effective for you

## 2021-04-29 NOTE — LETTER
April 29, 2021 3    Patient: Anderson Navarro   YOB: 1980   Date of Visit: 4/29/2021       To Whom It May Concern: It is my medical opinion that Flora Gracia may return to work on 05/03/2021          Sincerely,        CARMINE Jay    CC: No Recipients

## 2021-05-10 ENCOUNTER — RA CDI HCC (OUTPATIENT)
Dept: OTHER | Facility: HOSPITAL | Age: 41
End: 2021-05-10

## 2021-05-10 NOTE — PROGRESS NOTES
[facility] coding opportunities             Chart reviewed, (number of) suggestions sent to provider: 1           Patients insurance company: [employer] (Medicare Advantage and Commercial)           I72 9: Aneurysm ([facility]) - NOT USED

## 2021-05-14 ENCOUNTER — OFFICE VISIT (OUTPATIENT)
Dept: FAMILY MEDICINE CLINIC | Facility: CLINIC | Age: 41
End: 2021-05-14
Payer: COMMERCIAL

## 2021-05-14 VITALS
HEIGHT: 62 IN | SYSTOLIC BLOOD PRESSURE: 130 MMHG | BODY MASS INDEX: 41.41 KG/M2 | HEART RATE: 85 BPM | WEIGHT: 225 LBS | DIASTOLIC BLOOD PRESSURE: 82 MMHG | TEMPERATURE: 97.9 F | OXYGEN SATURATION: 98 % | RESPIRATION RATE: 16 BRPM

## 2021-05-14 DIAGNOSIS — M25.521 ARTHRALGIA OF BOTH ELBOWS: Primary | ICD-10-CM

## 2021-05-14 DIAGNOSIS — M25.522 ARTHRALGIA OF BOTH ELBOWS: Primary | ICD-10-CM

## 2021-05-14 DIAGNOSIS — J45.20 MILD INTERMITTENT ASTHMA, UNSPECIFIED WHETHER COMPLICATED: ICD-10-CM

## 2021-05-14 PROCEDURE — 3008F BODY MASS INDEX DOCD: CPT | Performed by: FAMILY MEDICINE

## 2021-05-14 PROCEDURE — 99214 OFFICE O/P EST MOD 30 MIN: CPT | Performed by: FAMILY MEDICINE

## 2021-05-14 PROCEDURE — 1036F TOBACCO NON-USER: CPT | Performed by: FAMILY MEDICINE

## 2021-05-14 RX ORDER — PREDNISONE 50 MG/1
50 TABLET ORAL DAILY
Qty: 5 TABLET | Refills: 0 | Status: SHIPPED | OUTPATIENT
Start: 2021-05-14 | End: 2021-05-19

## 2021-05-14 NOTE — PROGRESS NOTES
Assessment/Plan:    Arthralgia  Bilateral elbow pain  Rx for prednisone provided  Avoid activities that exacerbate pain  Due to rash, scalp lesion, and pain in multiple joints, will obtain baseline rheumatologic blood work  Asthma  Well controlled  Continue current management  Problem List Items Addressed This Visit        Respiratory    Asthma     Well controlled  Continue current management  Other    Arthralgia - Primary     Bilateral elbow pain  Rx for prednisone provided  Avoid activities that exacerbate pain  Due to rash, scalp lesion, and pain in multiple joints, will obtain baseline rheumatologic blood work  Relevant Medications    predniSONE 50 mg tablet    Other Relevant Orders    CBC and differential    C-reactive protein    KE Screen w/ Reflex to Titer/Pattern    Uric acid    RF Screen w/ Reflex to Titer            Subjective:      Patient ID: Clay Joe is a 39 y o  female  She presents today for evaluation of bilateral elbow pain  Reports that one month ago she woke up with left elbow pain  Denies any injury/fall/trauma  Notes she was outside all day prior, but but doesn't remember being bit or stung  She also had an erythematous, pruritic area on the posterior aspect of the left elbow at that time  She was seen at urgent care, where xrays were obtained and showed no osseous abnormality  She was ultimately diagnosed with bursitis and it was recommended that she use an ace bandage, NSAIDs, and rest  She notes that this didn't provide any relief and she continues to have bilateral elbow pain, left worse than right  4 days ago she noticed the onset of similar pain in the right elbow  Again, no trauma or injury  Elbow pain is localized to the medial and lateral aspects, and is made worse with extension  She feels as though she has lost ROM  Denies any radiation, N/T, weakness, but complains of stiffness and swelling in the fingers as well   This Sunday she noticed that her "nail beds were detaching" and had an odd discoloration as well  She also experienced an episode of full body pruritus, similar to that which she experiences with anesthesia, but there was no contact with new products or allergens that she is aware of  She now has a blocthy erythamtous rash on the neck  Notes a history of eczema or psoriasis on the scalp, as well as ankle and back pain  Symptoms seem to be exacerbated with stress  Is concerned she may have psoriatic arthritis  The following portions of the patient's history were reviewed and updated as appropriate: allergies, current medications, past family history, past medical history, past social history, past surgical history and problem list     Review of Systems   Constitutional: Negative for chills and fever  HENT: Negative for trouble swallowing  Eyes: Negative for visual disturbance  Respiratory: Negative for cough and shortness of breath  Cardiovascular: Negative for chest pain, palpitations and leg swelling  Gastrointestinal: Negative for abdominal pain, constipation and diarrhea  Endocrine: Negative for cold intolerance and heat intolerance  Genitourinary: Negative for difficulty urinating and dysuria  Musculoskeletal: Positive for arthralgias  Negative for gait problem  Skin: Negative for rash  Neurological: Negative for dizziness, tremors, seizures and headaches  Hematological: Negative for adenopathy  Psychiatric/Behavioral: Negative for behavioral problems  Objective:      /82 (BP Location: Left arm, Patient Position: Sitting, Cuff Size: Large)   Pulse 85   Temp 97 9 °F (36 6 °C) (Tympanic)   Resp 16   Ht 5' 2" (1 575 m)   Wt 102 kg (225 lb)   SpO2 98%   BMI 41 15 kg/m²          Physical Exam  Vitals signs and nursing note reviewed  Constitutional:       Appearance: She is well-developed  HENT:      Head: Normocephalic and atraumatic     Eyes:      Pupils: Pupils are equal, round, and reactive to light  Neck:      Musculoskeletal: Normal range of motion and neck supple  Cardiovascular:      Rate and Rhythm: Normal rate and regular rhythm  Heart sounds: Normal heart sounds  Pulmonary:      Effort: Pulmonary effort is normal       Breath sounds: Normal breath sounds  Abdominal:      General: Bowel sounds are normal       Palpations: Abdomen is soft  Musculoskeletal: Normal range of motion  General: Tenderness (  Tenderness to palpation over the elbows bilateral worse on the left ) present  Lymphadenopathy:      Cervical: No cervical adenopathy  Skin:     General: Skin is warm  Neurological:      Mental Status: She is alert and oriented to person, place, and time  Cranial Nerves: No cranial nerve deficit

## 2021-05-14 NOTE — ASSESSMENT & PLAN NOTE
Bilateral elbow pain  Rx for prednisone provided  Avoid activities that exacerbate pain  Due to rash, scalp lesion, and pain in multiple joints, will obtain baseline rheumatologic blood work

## 2021-05-15 ENCOUNTER — APPOINTMENT (OUTPATIENT)
Dept: LAB | Facility: IMAGING CENTER | Age: 41
End: 2021-05-15
Payer: COMMERCIAL

## 2021-05-15 DIAGNOSIS — M25.522 ARTHRALGIA OF BOTH ELBOWS: ICD-10-CM

## 2021-05-15 DIAGNOSIS — M25.521 ARTHRALGIA OF BOTH ELBOWS: ICD-10-CM

## 2021-05-15 LAB
BASOPHILS # BLD AUTO: 0.03 THOUSANDS/ΜL (ref 0–0.1)
BASOPHILS NFR BLD AUTO: 0 % (ref 0–1)
CRP SERPL QL: 8.9 MG/L
EOSINOPHIL # BLD AUTO: 0.09 THOUSAND/ΜL (ref 0–0.61)
EOSINOPHIL NFR BLD AUTO: 1 % (ref 0–6)
ERYTHROCYTE [DISTWIDTH] IN BLOOD BY AUTOMATED COUNT: 12.6 % (ref 11.6–15.1)
HCT VFR BLD AUTO: 38.9 % (ref 34.8–46.1)
HGB BLD-MCNC: 13.1 G/DL (ref 11.5–15.4)
IMM GRANULOCYTES # BLD AUTO: 0.03 THOUSAND/UL (ref 0–0.2)
IMM GRANULOCYTES NFR BLD AUTO: 0 % (ref 0–2)
LYMPHOCYTES # BLD AUTO: 1.9 THOUSANDS/ΜL (ref 0.6–4.47)
LYMPHOCYTES NFR BLD AUTO: 22 % (ref 14–44)
MCH RBC QN AUTO: 30.3 PG (ref 26.8–34.3)
MCHC RBC AUTO-ENTMCNC: 33.7 G/DL (ref 31.4–37.4)
MCV RBC AUTO: 90 FL (ref 82–98)
MONOCYTES # BLD AUTO: 0.43 THOUSAND/ΜL (ref 0.17–1.22)
MONOCYTES NFR BLD AUTO: 5 % (ref 4–12)
NEUTROPHILS # BLD AUTO: 6.15 THOUSANDS/ΜL (ref 1.85–7.62)
NEUTS SEG NFR BLD AUTO: 72 % (ref 43–75)
NRBC BLD AUTO-RTO: 0 /100 WBCS
PLATELET # BLD AUTO: 194 THOUSANDS/UL (ref 149–390)
PMV BLD AUTO: 10.4 FL (ref 8.9–12.7)
RBC # BLD AUTO: 4.33 MILLION/UL (ref 3.81–5.12)
URATE SERPL-MCNC: 5 MG/DL (ref 2–6.8)
WBC # BLD AUTO: 8.63 THOUSAND/UL (ref 4.31–10.16)

## 2021-05-15 PROCEDURE — 85025 COMPLETE CBC W/AUTO DIFF WBC: CPT

## 2021-05-15 PROCEDURE — 36415 COLL VENOUS BLD VENIPUNCTURE: CPT

## 2021-05-15 PROCEDURE — 86140 C-REACTIVE PROTEIN: CPT

## 2021-05-15 PROCEDURE — 86430 RHEUMATOID FACTOR TEST QUAL: CPT

## 2021-05-15 PROCEDURE — 84550 ASSAY OF BLOOD/URIC ACID: CPT

## 2021-05-15 PROCEDURE — 86038 ANTINUCLEAR ANTIBODIES: CPT

## 2021-05-17 LAB
RHEUMATOID FACT SER QL LA: NEGATIVE
RYE IGE QN: NEGATIVE

## 2021-05-18 ENCOUNTER — TELEPHONE (OUTPATIENT)
Dept: FAMILY MEDICINE CLINIC | Facility: CLINIC | Age: 41
End: 2021-05-18

## 2021-05-18 NOTE — TELEPHONE ENCOUNTER
----- Message from Nitesh Aguirre MD sent at 5/18/2021  6:54 AM EDT -----  Her blood work came back showing elevated CRP    All the rest of the blood work came back normal   She is to see rheumatologist

## 2021-05-24 ENCOUNTER — TRANSCRIBE ORDERS (OUTPATIENT)
Dept: ADMINISTRATIVE | Facility: HOSPITAL | Age: 41
End: 2021-05-24

## 2021-05-24 DIAGNOSIS — M05.9 RHEUMATOID ARTHRITIS WITH RHEUMATOID FACTOR, UNSPECIFIED (HCC): Primary | ICD-10-CM

## 2021-05-24 DIAGNOSIS — L40.59 OTHER PSORIATIC ARTHROPATHY (HCC): Primary | ICD-10-CM

## 2021-05-24 DIAGNOSIS — L40.50 PSORIATIC ARTHRITIS (HCC): ICD-10-CM

## 2021-06-18 ENCOUNTER — HOSPITAL ENCOUNTER (OUTPATIENT)
Dept: RADIOLOGY | Facility: HOSPITAL | Age: 41
End: 2021-06-18
Payer: COMMERCIAL

## 2021-06-18 ENCOUNTER — HOSPITAL ENCOUNTER (OUTPATIENT)
Dept: RADIOLOGY | Facility: HOSPITAL | Age: 41
Discharge: HOME | End: 2021-06-18
Payer: COMMERCIAL

## 2021-06-18 DIAGNOSIS — L40.50 PSORIATIC ARTHRITIS (HCC): ICD-10-CM

## 2021-06-18 DIAGNOSIS — L40.59 OTHER PSORIATIC ARTHROPATHY (HCC): ICD-10-CM

## 2021-06-18 DIAGNOSIS — M05.9 RHEUMATOID ARTHRITIS WITH RHEUMATOID FACTOR, UNSPECIFIED (HCC): ICD-10-CM

## 2021-06-18 PROCEDURE — 76882 US LMTD JT/FCL EVL NVASC XTR: CPT

## 2021-09-21 PROBLEM — M54.50 CHRONIC LOW BACK PAIN WITHOUT SCIATICA: Status: ACTIVE | Noted: 2021-09-21

## 2021-09-21 PROBLEM — G89.29 CHRONIC LOW BACK PAIN WITHOUT SCIATICA: Status: ACTIVE | Noted: 2021-09-21

## 2021-09-21 PROBLEM — M79.7 FIBROMYALGIA: Status: ACTIVE | Noted: 2021-09-21

## 2021-09-21 PROBLEM — M77.12 LATERAL EPICONDYLITIS OF BOTH ELBOWS: Status: ACTIVE | Noted: 2021-09-21

## 2021-09-21 PROBLEM — Z15.09 LYNCH SYNDROME: Status: ACTIVE | Noted: 2021-09-21

## 2021-09-21 PROBLEM — M76.60 ACHILLES BURSITIS: Status: ACTIVE | Noted: 2021-09-21

## 2021-09-21 PROBLEM — K21.9 GASTROESOPHAGEAL REFLUX DISEASE WITHOUT ESOPHAGITIS: Status: ACTIVE | Noted: 2021-09-21

## 2021-09-21 PROBLEM — M15.0 PRIMARY GENERALIZED (OSTEO)ARTHRITIS: Status: ACTIVE | Noted: 2021-09-21

## 2021-09-21 PROBLEM — M77.11 LATERAL EPICONDYLITIS OF BOTH ELBOWS: Status: ACTIVE | Noted: 2021-09-21

## 2021-11-12 ENCOUNTER — RA CDI HCC (OUTPATIENT)
Dept: OTHER | Facility: HOSPITAL | Age: 41
End: 2021-11-12

## 2022-02-14 DIAGNOSIS — F41.9 ANXIETY: ICD-10-CM

## 2022-02-21 ENCOUNTER — OFFICE VISIT (OUTPATIENT)
Dept: FAMILY MEDICINE CLINIC | Facility: CLINIC | Age: 42
End: 2022-02-21
Payer: COMMERCIAL

## 2022-02-21 VITALS
BODY MASS INDEX: 41.33 KG/M2 | RESPIRATION RATE: 16 BRPM | HEIGHT: 63 IN | DIASTOLIC BLOOD PRESSURE: 84 MMHG | WEIGHT: 233.25 LBS | HEART RATE: 86 BPM | OXYGEN SATURATION: 96 % | SYSTOLIC BLOOD PRESSURE: 132 MMHG | TEMPERATURE: 97.6 F

## 2022-02-21 DIAGNOSIS — Z00.00 HEALTHCARE MAINTENANCE: Primary | ICD-10-CM

## 2022-02-21 DIAGNOSIS — J45.20 MILD INTERMITTENT ASTHMA, UNSPECIFIED WHETHER COMPLICATED: ICD-10-CM

## 2022-02-21 DIAGNOSIS — Z12.31 ENCOUNTER FOR SCREENING MAMMOGRAM FOR MALIGNANT NEOPLASM OF BREAST: ICD-10-CM

## 2022-02-21 DIAGNOSIS — E78.49 OTHER HYPERLIPIDEMIA: ICD-10-CM

## 2022-02-21 DIAGNOSIS — E66.01 MORBID OBESITY WITH BMI OF 40.0-44.9, ADULT (HCC): ICD-10-CM

## 2022-02-21 DIAGNOSIS — R73.9 HYPERGLYCEMIA: ICD-10-CM

## 2022-02-21 DIAGNOSIS — F41.9 ANXIETY: ICD-10-CM

## 2022-02-21 PROCEDURE — 1036F TOBACCO NON-USER: CPT | Performed by: FAMILY MEDICINE

## 2022-02-21 PROCEDURE — 99396 PREV VISIT EST AGE 40-64: CPT | Performed by: FAMILY MEDICINE

## 2022-02-21 PROCEDURE — 3008F BODY MASS INDEX DOCD: CPT | Performed by: FAMILY MEDICINE

## 2022-02-21 RX ORDER — ALBUTEROL SULFATE 90 UG/1
2 AEROSOL, METERED RESPIRATORY (INHALATION) EVERY 6 HOURS PRN
Qty: 18 G | Refills: 1 | Status: SHIPPED | OUTPATIENT
Start: 2022-02-21 | End: 2022-08-03

## 2022-02-21 RX ORDER — ESTRADIOL 0.5 MG/1
0.5 TABLET ORAL DAILY
COMMUNITY
Start: 2021-09-15 | End: 2022-09-15

## 2022-02-21 NOTE — PROGRESS NOTES
Assessment/Plan:  Asthma  Stable  Continue same  She was given refill for albuterol inhaler  Anxiety  Stable  Continue same  Will continue to monitor  Healthcare maintenance  It was discussed about immunizations, diet, exercise and safety measures  Diagnoses and all orders for this visit:    Healthcare maintenance    Anxiety  -     sertraline (ZOLOFT) 50 mg tablet; Take 1 5 tablets (75 mg total) by mouth daily    Mild intermittent asthma, unspecified whether complicated  -     albuterol (PROVENTIL HFA,VENTOLIN HFA) 90 mcg/act inhaler; Inhale 2 puffs every 6 (six) hours as needed for wheezing    Other hyperlipidemia  -     Lipid Panel with Direct LDL reflex; Future  -     TSH, 3rd generation with Free T4 reflex; Future  -     Comprehensive metabolic panel; Future    Hyperglycemia  -     Hemoglobin A1C; Future    Encounter for screening mammogram for malignant neoplasm of breast  -     Mammo screening bilateral w 3d & cad; Future    Morbid obesity with BMI of 40 0-44 9, adult ([facility] [address] )    Other orders  -     estradiol (ESTRACE) 0 5 MG tablet; Take 0 5 mg by mouth daily          There are no Patient Instructions on file for this visit  Return in about 6 months (around 8/21/2022)  Subjective:      Patient ID: Nicci Farnsworth is a 39 y o  female  Chief Complaint   Patient presents with    Anxiety    Migraine    GERD       She is here today for wellness exam   She has been taking her medications  She denies any side effects from her medications  She is requesting refills for albuterol inhaler and sertraline  She had COVID in January and she was feeling fatigue and tired but that has been improving  The following portions of the patient's history were reviewed and updated as appropriate: allergies, current medications, past family history, past medical history, past social history, past surgical history and problem list     Review of Systems   Constitutional: Negative for chills and fever  HENT: Negative for trouble swallowing  Eyes: Negative for visual disturbance  Respiratory: Negative for cough and shortness of breath  Cardiovascular: Negative for chest pain, palpitations and leg swelling  Gastrointestinal: Negative for abdominal pain, constipation and diarrhea  Endocrine: Negative for cold intolerance and heat intolerance  Genitourinary: Negative for difficulty urinating and dysuria  Musculoskeletal: Negative for gait problem  Skin: Negative for rash  Neurological: Negative for dizziness, tremors, seizures and headaches  Hematological: Negative for adenopathy  Psychiatric/Behavioral: Negative for behavioral problems           Current Outpatient Medications   Medication Sig Dispense Refill    albuterol (PROVENTIL HFA,VENTOLIN HFA) 90 mcg/act inhaler Inhale 2 puffs every 6 (six) hours as needed for wheezing 18 g 1    estradiol (ESTRACE) 0 5 MG tablet Take 0 5 mg by mouth daily      famotidine (PEPCID) 40 MG tablet Take 1 tablet (40 mg total) by mouth daily at bedtime 90 tablet 1    fexofenadine (ALLEGRA) 180 MG tablet Take 180 mg by mouth      LORazepam (ATIVAN) 0 5 mg tablet Take 1 tablet (0 5 mg total) by mouth daily as needed for anxiety 20 tablet 0    montelukast (SINGULAIR) 10 mg tablet TAKE 1 TABLET EVERY EVENING 90 tablet 3    naproxen (NAPROSYN) 500 mg tablet Take 1 tablet (500 mg total) by mouth 2 (two) times a day with meals 60 tablet 2    omeprazole (PriLOSEC) 40 MG capsule TAKE 1 (ONE) CAPSULE DAILY, 1/2 HOUR BEFORE BREAKFAST  1    sertraline (ZOLOFT) 50 mg tablet Take 1 5 tablets (75 mg total) by mouth daily 135 tablet 1    phentermine 37 5 MG capsule Take 1 capsule (37 5 mg total) by mouth every morning (Patient not taking: Reported on 1/15/2021) 30 capsule 0    PREMARIN 0 625 MG tablet  (Patient not taking: Reported on 2/21/2022 )      topiramate (TOPAMAX) 25 mg tablet Take 1 tablet (25 mg total) by mouth 2 (two) times a day (Patient not taking: Reported on 2/21/2022 ) 60 tablet 3     No current facility-administered medications for this visit  Objective:    /84 (BP Location: Left arm, Patient Position: Sitting, Cuff Size: Adult)   Pulse 86   Temp 97 6 °F (36 4 °C) (Tympanic)   Resp 16   Ht 5' 3 25" (1 607 m)   Wt 106 kg (233 lb 4 oz)   SpO2 96%   BMI 40 99 kg/m²        Physical Exam  Vitals and nursing note reviewed  Constitutional:       Appearance: She is well-developed  HENT:      Head: Normocephalic and atraumatic  Eyes:      Pupils: Pupils are equal, round, and reactive to light  Cardiovascular:      Rate and Rhythm: Normal rate and regular rhythm  Heart sounds: Normal heart sounds  Pulmonary:      Effort: Pulmonary effort is normal       Breath sounds: Normal breath sounds  Abdominal:      General: Bowel sounds are normal       Palpations: Abdomen is soft  Musculoskeletal:         General: Normal range of motion  Cervical back: Normal range of motion and neck supple  Lymphadenopathy:      Cervical: No cervical adenopathy  Skin:     General: Skin is warm  Neurological:      Mental Status: She is alert and oriented to person, place, and time  Cranial Nerves: No cranial nerve deficit  Danielle Dow MD BMI Counseling: Body mass index is 40 99 kg/m²  The BMI is above normal  Nutrition recommendations include reducing portion sizes, decreasing overall calorie intake and 3-5 servings of fruits/vegetables daily  Exercise recommendations include moderate aerobic physical activity for 150 minutes/week

## 2022-03-29 ENCOUNTER — APPOINTMENT (OUTPATIENT)
Dept: RADIOLOGY | Facility: MEDICAL CENTER | Age: 42
End: 2022-03-29
Payer: OTHER MISCELLANEOUS

## 2022-03-29 ENCOUNTER — OFFICE VISIT (OUTPATIENT)
Dept: URGENT CARE | Facility: MEDICAL CENTER | Age: 42
End: 2022-03-29
Payer: OTHER MISCELLANEOUS

## 2022-03-29 DIAGNOSIS — S83.421A SPRAIN OF LATERAL COLLATERAL LIGAMENT OF RIGHT KNEE, INITIAL ENCOUNTER: ICD-10-CM

## 2022-03-29 DIAGNOSIS — S83.421A SPRAIN OF LATERAL COLLATERAL LIGAMENT OF RIGHT KNEE, INITIAL ENCOUNTER: Primary | ICD-10-CM

## 2022-03-29 PROCEDURE — 73564 X-RAY EXAM KNEE 4 OR MORE: CPT

## 2022-03-29 PROCEDURE — 99283 EMERGENCY DEPT VISIT LOW MDM: CPT | Performed by: PHYSICIAN ASSISTANT

## 2022-03-29 PROCEDURE — G0382 LEV 3 HOSP TYPE B ED VISIT: HCPCS | Performed by: PHYSICIAN ASSISTANT

## 2022-04-04 ENCOUNTER — APPOINTMENT (OUTPATIENT)
Dept: URGENT CARE | Facility: MEDICAL CENTER | Age: 42
End: 2022-04-04
Payer: OTHER MISCELLANEOUS

## 2022-04-04 PROCEDURE — 99213 OFFICE O/P EST LOW 20 MIN: CPT | Performed by: PHYSICIAN ASSISTANT

## 2022-04-08 DIAGNOSIS — J30.9 ALLERGIC RHINITIS, UNSPECIFIED SEASONALITY, UNSPECIFIED TRIGGER: ICD-10-CM

## 2022-04-08 RX ORDER — MONTELUKAST SODIUM 10 MG/1
TABLET ORAL
Qty: 90 TABLET | Refills: 1 | Status: SHIPPED | OUTPATIENT
Start: 2022-04-08

## 2022-04-11 ENCOUNTER — APPOINTMENT (OUTPATIENT)
Dept: URGENT CARE | Facility: MEDICAL CENTER | Age: 42
End: 2022-04-11
Payer: OTHER MISCELLANEOUS

## 2022-04-11 PROCEDURE — 99213 OFFICE O/P EST LOW 20 MIN: CPT | Performed by: PHYSICIAN ASSISTANT

## 2022-04-26 ENCOUNTER — APPOINTMENT (OUTPATIENT)
Dept: URGENT CARE | Facility: MEDICAL CENTER | Age: 42
End: 2022-04-26
Payer: OTHER MISCELLANEOUS

## 2022-04-26 PROCEDURE — 99213 OFFICE O/P EST LOW 20 MIN: CPT | Performed by: PHYSICIAN ASSISTANT

## 2022-04-27 ENCOUNTER — TELEPHONE (OUTPATIENT)
Dept: OBGYN CLINIC | Facility: HOSPITAL | Age: 42
End: 2022-04-27

## 2022-04-27 DIAGNOSIS — M25.50 ARTHRALGIA, UNSPECIFIED JOINT: ICD-10-CM

## 2022-04-27 RX ORDER — NAPROXEN 500 MG/1
TABLET ORAL
Qty: 180 TABLET | Refills: 1 | Status: SHIPPED | OUTPATIENT
Start: 2022-04-27

## 2022-04-27 NOTE — TELEPHONE ENCOUNTER
Called patient to offer possible same day slot with Dr Essie Angel in [address] [facility] call back

## 2022-04-27 NOTE — TELEPHONE ENCOUNTER
Force on request sent to [facility],  Please advise if the following patient can be forced onto the schedule:    Patient: Javier Camacho    : [phone]    MRN: [id number]    Call back #:      Insurance: work compensation    Reason for appointment: [facility] occupational medicine called with confirmed Right meniscus tear work comp looking to schedule ASAP    Requested doctor/location: Anel/Joel      Thank you

## 2022-04-28 ENCOUNTER — OFFICE VISIT (OUTPATIENT)
Dept: OBGYN CLINIC | Facility: MEDICAL CENTER | Age: 42
End: 2022-04-28
Payer: OTHER MISCELLANEOUS

## 2022-04-28 ENCOUNTER — CLINICAL SUPPORT (OUTPATIENT)
Dept: URGENT CARE | Facility: MEDICAL CENTER | Age: 42
End: 2022-04-28
Payer: OTHER MISCELLANEOUS

## 2022-04-28 VITALS
WEIGHT: 236 LBS | SYSTOLIC BLOOD PRESSURE: 154 MMHG | HEIGHT: 62 IN | DIASTOLIC BLOOD PRESSURE: 94 MMHG | BODY MASS INDEX: 43.43 KG/M2

## 2022-04-28 DIAGNOSIS — Z01.818 PRE-OP TESTING: ICD-10-CM

## 2022-04-28 DIAGNOSIS — S83.241A OTHER TEAR OF MEDIAL MENISCUS, CURRENT INJURY, RIGHT KNEE, INITIAL ENCOUNTER: Primary | ICD-10-CM

## 2022-04-28 DIAGNOSIS — S83.241A OTHER TEAR OF MEDIAL MENISCUS, CURRENT INJURY, RIGHT KNEE, INITIAL ENCOUNTER: ICD-10-CM

## 2022-04-28 LAB
ATRIAL RATE: 81 BPM
P AXIS: 34 DEGREES
PR INTERVAL: 140 MS
QRS AXIS: 15 DEGREES
QRSD INTERVAL: 76 MS
QT INTERVAL: 384 MS
QTC INTERVAL: 446 MS
T WAVE AXIS: 26 DEGREES
VENTRICULAR RATE: 81 BPM

## 2022-04-28 PROCEDURE — 93010 ELECTROCARDIOGRAM REPORT: CPT | Performed by: INTERNAL MEDICINE

## 2022-04-28 PROCEDURE — 93005 ELECTROCARDIOGRAM TRACING: CPT

## 2022-04-28 PROCEDURE — 99204 OFFICE O/P NEW MOD 45 MIN: CPT | Performed by: ORTHOPAEDIC SURGERY

## 2022-04-28 RX ORDER — ACETAMINOPHEN 325 MG/1
650 TABLET ORAL EVERY 6 HOURS PRN
Status: CANCELLED | OUTPATIENT
Start: 2022-04-28

## 2022-04-28 RX ORDER — CEFAZOLIN SODIUM 2 G/50ML
2000 SOLUTION INTRAVENOUS ONCE
Status: CANCELLED | OUTPATIENT
Start: 2022-05-24 | End: 2022-04-28

## 2022-04-28 RX ORDER — TRAMADOL HYDROCHLORIDE 50 MG/1
50 TABLET ORAL EVERY 6 HOURS PRN
Status: CANCELLED | OUTPATIENT
Start: 2022-04-28

## 2022-04-28 NOTE — PROGRESS NOTES
Ortho Sports Medicine Knee New Patient Visit     Assesment:   43 y o  female right knee medial meniscus tear    Plan:    Conservative treatment:    Post op PT  Crutches ordered  Work note written    Imaging: All imaging from today was reviewed by myself and explained to the patient  Injection:    No Injection planned at this time  Surgery: All of the risks and benefits of operative treatment were explained to the patient, as well as the risks and benefits of any alternative treatment options, including nonoperative care  The risks of surgical treatement include, but are not limited to, infection, bleeding, blood clot, neurovascular damage, need for further surgery, continued pain, cardiovascular risk, and anesthesia risk  The patient understood this and elects to proceed forward with surgical intervention  We will proceed forward with surgical arthroscopy of the knee with right knee meniscectomy     Patient denies history of blood clot or having a bleeding or clotting disorder  She denies prior cardiac conditions  She denies an formal allergies to antibiotics or medications  She denies history of MRSA  Follow up:    Return for 1 week post-op  Chief Complaint   Patient presents with    Right Knee - Pain       History of Present Illness: 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, 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, seen in clinic for evaluation of right knee pain  Pain is located lateral, medial   The patient rates the pain as a 7/10  The pain has been present for 1 months  The patient sustained an injury on 3/28/22  The mechanism of injury was when the patient was attempting to restrain a child at work  She believes that her knee twisted while she was attempting to restrain  She had pain in the knee a few hours later and had difficulty walking  She had no pain in the knee prior to this incident   She notes some swelling, clicking and locking of the knee      She has tried a few sessions of PT and NSAIDs with no relief  She would like to move forward with surgery  Knee Surgical History:  None    Past Medical, Social and Family History:  Past Medical History:   Diagnosis Date    Allergy     Anxiety and depression     Asthma     Fibromyalgia, primary     Atkinson syndrome     Morbid obesity with BMI of 40 0-44 9, adult [facility])      Past Surgical History:   Procedure Laterality Date    CARPAL TUNNEL RELEASE Right      SECTION      2 C sections    REDUCTION MAMMAPLASTY Bilateral 2018    TONSILLECTOMY      TOTAL ABDOMINAL HYSTERECTOMY      onset: 7/15/14     Allergies   Allergen Reactions    Fluticasone     Pollen Extract      Other reaction(s):  Other (See Comments)  SNEEZING SINUS PROBLEMS     Current Outpatient Medications on File Prior to Visit   Medication Sig Dispense Refill    albuterol (PROVENTIL HFA,VENTOLIN HFA) 90 mcg/act inhaler Inhale 2 puffs every 6 (six) hours as needed for wheezing 18 g 1    estradiol (ESTRACE) 0 5 MG tablet Take 0 5 mg by mouth daily      famotidine (PEPCID) 40 MG tablet Take 1 tablet (40 mg total) by mouth daily at bedtime 90 tablet 1    fexofenadine (ALLEGRA) 180 MG tablet Take 180 mg by mouth      LORazepam (ATIVAN) 0 5 mg tablet Take 1 tablet (0 5 mg total) by mouth daily as needed for anxiety 20 tablet 0    montelukast (SINGULAIR) 10 mg tablet TAKE 1 TABLET EVERY EVENING 90 tablet 1    naproxen (NAPROSYN) 500 mg tablet TAKE 1 TABLET TWICE A DAY WITH MEALS 180 tablet 1    omeprazole (PriLOSEC) 40 MG capsule TAKE 1 (ONE) CAPSULE DAILY, 1/2 HOUR BEFORE BREAKFAST  1    phentermine 37 5 MG capsule Take 1 capsule (37 5 mg total) by mouth every morning (Patient not taking: Reported on 1/15/2021) 30 capsule 0    PREMARIN 0 625 MG tablet  (Patient not taking: Reported on 2022 )      sertraline (ZOLOFT) 50 mg tablet Take 1 5 tablets (75 mg total) by mouth daily 135 tablet 1    topiramate (TOPAMAX) 25 mg tablet Take 1 tablet (25 mg total) by mouth 2 (two) times a day (Patient not taking: Reported on 2/21/2022 ) 60 tablet 3     No current facility-administered medications on file prior to visit  Social History     Socioeconomic History    Marital status: /Civil Union     Spouse name: Not on file    Number of children: Not on file    Years of education: Not on file    Highest education level: Not on file   Occupational History    Not on file   Tobacco Use    Smoking status: Never Smoker    Smokeless tobacco: Never Used    Tobacco comment: no passive smoke exposure   Vaping Use    Vaping Use: Never used   Substance and Sexual Activity    Alcohol use: Yes     Comment: socially    Drug use: Never    Sexual activity: Yes   Other Topics Concern    Not on file   Social History Narrative    Not on file     Social Determinants of Health     Financial Resource Strain: Not on file   Food Insecurity: Not on file   Transportation Needs: Not on file   Physical Activity: Not on file   Stress: Not on file   Social Connections: Not on file   Intimate Partner Violence: Not on file   Housing Stability: Not on file         I have reviewed the past medical, surgical, social and family history, medications and allergies as documented in the EMR  Review of systems: ROS is negative other than that noted in the HPI  Constitutional: Negative for fatigue and fever  HENT: Negative for sore throat  Respiratory: Negative for shortness of breath  Cardiovascular: Negative for chest pain  Gastrointestinal: Negative for abdominal pain  Endocrine: Negative for cold intolerance and heat intolerance  Genitourinary: Negative for flank pain  Musculoskeletal: Negative for back pain  Skin: Negative for rash  Allergic/Immunologic: Negative for immunocompromised state  Neurological: Negative for dizziness  Psychiatric/Behavioral: Negative for agitation        Physical Exam:    Blood pressure 154/94, height 5' 2" (1 575 m), weight 107 kg (236 lb)  General/Constitutional: NAD, well developed, well nourished  HENT: Normocephalic, atraumatic  CV: Intact distal pulses, regular rate  Resp: No respiratory distress or labored breathing  Lymphatic: No lymphadenopathy palpated  Neuro: Alert and Oriented x 3, no focal deficits  Psych: Normal mood, normal affect, normal judgement, normal behavior  Skin: Warm, dry, no rashes, no erythema      Knee Exam (focused): RIGHT LEFT   ROM:   0-130 0-130   Palpation: Effusion mild negative     MJL tenderness Positive Negative     LJL tenderness Negative Negative   Meniscus: Merlyn Positive Negative    Apley's Compression Positive Negative   Instability: Varus Stable w/ pain laterally stable     Valgus Stable w/ pain medially stable   Special Tests: Lachman Negative Negative     Posterior drawer Negative Negative     Anterior drawer Negative Negative     Pivot shift not tested not tested     Dial not tested not tested   Patella: Palpation no tenderness no tenderness     Mobility 1/4 1/4     Apprehension Negative Negative   Other: Single leg 1/4 squat not tested not tested      Right knee IT band TTP    LE NV Exam: +2 DP/PT pulses bilaterally  Sensation intact to light touch L2-S1 bilaterally     Bilateral hip ROM demonstrates no pain actively or passively    No calf tenderness to palpation bilaterally    Knee Imaging    X-rays of the right knee were reviewed, which demonstrate no acute fractures or osseous abnormalities  I have reviewed the radiology report and agree with their impression  MRI of the right knee demonstrates medial meniscus tear  I have reviewed the radiology report and agree with their impression      Scribe Attestation    I,:  Cesia Pedraza am acting as a scribe while in the presence of the attending physician :       I,:  Esperanza Lord DO personally performed the services described in this documentation    as scribed in my presence :

## 2022-04-28 NOTE — LETTER
April 28, 2022     Patient: Valentino Cousins  YOB: 1980  Date of Visit: 4/28/2022      To Whom it May Concern:    Deanna Oneal is under my professional care  [facility] was seen in my office on 4/28/2022  [facility] will be undergoing right knee surgery  She will be out of work from the day of surgery until her 1 week post-op follow-up appointment  At that appointment we will re-evaluated her work status  If you have any questions or concerns, please don't hesitate to call           Sincerely,          Vonnie Hutchinson, DO        CC: Vandana Ogden

## 2022-04-28 NOTE — H&P (VIEW-ONLY)
Ortho Sports Medicine Knee New Patient Visit     Assesment:   43 y o  female right knee medial meniscus tear    Plan:    Conservative treatment:    Post op PT  Crutches ordered  Work note written    Imaging: All imaging from today was reviewed by myself and explained to the patient  Injection:    No Injection planned at this time  Surgery: All of the risks and benefits of operative treatment were explained to the patient, as well as the risks and benefits of any alternative treatment options, including nonoperative care  The risks of surgical treatement include, but are not limited to, infection, bleeding, blood clot, neurovascular damage, need for further surgery, continued pain, cardiovascular risk, and anesthesia risk  The patient understood this and elects to proceed forward with surgical intervention  We will proceed forward with surgical arthroscopy of the knee with right knee meniscectomy     Patient denies history of blood clot or having a bleeding or clotting disorder  She denies prior cardiac conditions  She denies an formal allergies to antibiotics or medications  She denies history of MRSA  Follow up:    Return for 1 week post-op  Chief Complaint   Patient presents with    Right Knee - Pain       History of Present Illness: 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, 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, seen in clinic for evaluation of right knee pain  Pain is located lateral, medial   The patient rates the pain as a 7/10  The pain has been present for 1 months  The patient sustained an injury on 3/28/22  The mechanism of injury was when the patient was attempting to restrain a child at work  She believes that her knee twisted while she was attempting to restrain  She had pain in the knee a few hours later and had difficulty walking  She had no pain in the knee prior to this incident   She notes some swelling, clicking and locking of the knee      She has tried a few sessions of PT and NSAIDs with no relief  She would like to move forward with surgery  Knee Surgical History:  None    Past Medical, Social and Family History:  Past Medical History:   Diagnosis Date    Allergy     Anxiety and depression     Asthma     Fibromyalgia, primary     Atkinson syndrome     Morbid obesity with BMI of 40 0-44 9, adult [facility])      Past Surgical History:   Procedure Laterality Date    CARPAL TUNNEL RELEASE Right      SECTION      2 C sections    REDUCTION MAMMAPLASTY Bilateral 2018    TONSILLECTOMY      TOTAL ABDOMINAL HYSTERECTOMY      onset: 7/15/14     Allergies   Allergen Reactions    Fluticasone     Pollen Extract      Other reaction(s):  Other (See Comments)  SNEEZING SINUS PROBLEMS     Current Outpatient Medications on File Prior to Visit   Medication Sig Dispense Refill    albuterol (PROVENTIL HFA,VENTOLIN HFA) 90 mcg/act inhaler Inhale 2 puffs every 6 (six) hours as needed for wheezing 18 g 1    estradiol (ESTRACE) 0 5 MG tablet Take 0 5 mg by mouth daily      famotidine (PEPCID) 40 MG tablet Take 1 tablet (40 mg total) by mouth daily at bedtime 90 tablet 1    fexofenadine (ALLEGRA) 180 MG tablet Take 180 mg by mouth      LORazepam (ATIVAN) 0 5 mg tablet Take 1 tablet (0 5 mg total) by mouth daily as needed for anxiety 20 tablet 0    montelukast (SINGULAIR) 10 mg tablet TAKE 1 TABLET EVERY EVENING 90 tablet 1    naproxen (NAPROSYN) 500 mg tablet TAKE 1 TABLET TWICE A DAY WITH MEALS 180 tablet 1    omeprazole (PriLOSEC) 40 MG capsule TAKE 1 (ONE) CAPSULE DAILY, 1/2 HOUR BEFORE BREAKFAST  1    phentermine 37 5 MG capsule Take 1 capsule (37 5 mg total) by mouth every morning (Patient not taking: Reported on 1/15/2021) 30 capsule 0    PREMARIN 0 625 MG tablet  (Patient not taking: Reported on 2022 )      sertraline (ZOLOFT) 50 mg tablet Take 1 5 tablets (75 mg total) by mouth daily 135 tablet 1    topiramate (TOPAMAX) 25 mg tablet Take 1 tablet (25 mg total) by mouth 2 (two) times a day (Patient not taking: Reported on 2/21/2022 ) 60 tablet 3     No current facility-administered medications on file prior to visit  Social History     Socioeconomic History    Marital status: /Civil Union     Spouse name: Not on file    Number of children: Not on file    Years of education: Not on file    Highest education level: Not on file   Occupational History    Not on file   Tobacco Use    Smoking status: Never Smoker    Smokeless tobacco: Never Used    Tobacco comment: no passive smoke exposure   Vaping Use    Vaping Use: Never used   Substance and Sexual Activity    Alcohol use: Yes     Comment: socially    Drug use: Never    Sexual activity: Yes   Other Topics Concern    Not on file   Social History Narrative    Not on file     Social Determinants of Health     Financial Resource Strain: Not on file   Food Insecurity: Not on file   Transportation Needs: Not on file   Physical Activity: Not on file   Stress: Not on file   Social Connections: Not on file   Intimate Partner Violence: Not on file   Housing Stability: Not on file         I have reviewed the past medical, surgical, social and family history, medications and allergies as documented in the EMR  Review of systems: ROS is negative other than that noted in the HPI  Constitutional: Negative for fatigue and fever  HENT: Negative for sore throat  Respiratory: Negative for shortness of breath  Cardiovascular: Negative for chest pain  Gastrointestinal: Negative for abdominal pain  Endocrine: Negative for cold intolerance and heat intolerance  Genitourinary: Negative for flank pain  Musculoskeletal: Negative for back pain  Skin: Negative for rash  Allergic/Immunologic: Negative for immunocompromised state  Neurological: Negative for dizziness  Psychiatric/Behavioral: Negative for agitation        Physical Exam:    Blood pressure 154/94, height 5' 2" (1 575 m), weight 107 kg (236 lb)  General/Constitutional: NAD, well developed, well nourished  HENT: Normocephalic, atraumatic  CV: Intact distal pulses, regular rate  Resp: No respiratory distress or labored breathing  Lymphatic: No lymphadenopathy palpated  Neuro: Alert and Oriented x 3, no focal deficits  Psych: Normal mood, normal affect, normal judgement, normal behavior  Skin: Warm, dry, no rashes, no erythema      Knee Exam (focused): RIGHT LEFT   ROM:   0-130 0-130   Palpation: Effusion mild negative     MJL tenderness Positive Negative     LJL tenderness Negative Negative   Meniscus: Merlyn Positive Negative    Apley's Compression Positive Negative   Instability: Varus Stable w/ pain laterally stable     Valgus Stable w/ pain medially stable   Special Tests: Lachman Negative Negative     Posterior drawer Negative Negative     Anterior drawer Negative Negative     Pivot shift not tested not tested     Dial not tested not tested   Patella: Palpation no tenderness no tenderness     Mobility 1/4 1/4     Apprehension Negative Negative   Other: Single leg 1/4 squat not tested not tested      Right knee IT band TTP    LE NV Exam: +2 DP/PT pulses bilaterally  Sensation intact to light touch L2-S1 bilaterally     Bilateral hip ROM demonstrates no pain actively or passively    No calf tenderness to palpation bilaterally    Knee Imaging    X-rays of the right knee were reviewed, which demonstrate no acute fractures or osseous abnormalities  I have reviewed the radiology report and agree with their impression  MRI of the right knee demonstrates medial meniscus tear  I have reviewed the radiology report and agree with their impression      Scribe Attestation    I,:  Nuvia Lowe am acting as a scribe while in the presence of the attending physician :       I,:  Claudine Zarate DO personally performed the services described in this documentation    as scribed in my presence :

## 2022-05-11 ENCOUNTER — TELEPHONE (OUTPATIENT)
Dept: OBGYN CLINIC | Facility: HOSPITAL | Age: 42
End: 2022-05-11

## 2022-05-16 ENCOUNTER — ANESTHESIA EVENT (OUTPATIENT)
Dept: PERIOP | Facility: HOSPITAL | Age: 42
End: 2022-05-16
Payer: OTHER MISCELLANEOUS

## 2022-05-19 NOTE — PRE-PROCEDURE INSTRUCTIONS
Pre-Surgery Instructions:   Medication Instructions    estradiol (ESTRACE) 0 5 MG tablet Take day of surgery   famotidine (PEPCID) 40 MG tablet Take night before surgery    fexofenadine (ALLEGRA) 180 MG tablet Stop taking 5 days prior to surgery   montelukast (SINGULAIR) 10 mg tablet Take day of surgery   omeprazole (PriLOSEC) 40 MG capsule Take day of surgery   sertraline (ZOLOFT) 50 mg tablet Take night before surgery   Pre procedure instructions given, verbalizes understanding  NPO after MN  Bathing reviewed  No NSAIDS   Stop supplements/vitamins

## 2022-05-24 ENCOUNTER — ANESTHESIA (OUTPATIENT)
Dept: PERIOP | Facility: HOSPITAL | Age: 42
End: 2022-05-24
Payer: OTHER MISCELLANEOUS

## 2022-05-24 ENCOUNTER — HOSPITAL ENCOUNTER (OUTPATIENT)
Facility: HOSPITAL | Age: 42
Setting detail: OUTPATIENT SURGERY
Discharge: HOME | End: 2022-05-24
Attending: ORTHOPAEDIC SURGERY | Admitting: ORTHOPAEDIC SURGERY
Payer: OTHER MISCELLANEOUS

## 2022-05-24 VITALS
TEMPERATURE: 97.2 F | HEIGHT: 62 IN | BODY MASS INDEX: 43.43 KG/M2 | HEART RATE: 78 BPM | SYSTOLIC BLOOD PRESSURE: 124 MMHG | DIASTOLIC BLOOD PRESSURE: 75 MMHG | RESPIRATION RATE: 18 BRPM | OXYGEN SATURATION: 98 % | WEIGHT: 236 LBS

## 2022-05-24 DIAGNOSIS — Z98.890 S/P MENISCECTOMY: Primary | ICD-10-CM

## 2022-05-24 PROCEDURE — 29881 ARTHRS KNE SRG MNISECTMY M/L: CPT | Performed by: ORTHOPAEDIC SURGERY

## 2022-05-24 RX ORDER — LIDOCAINE HYDROCHLORIDE 10 MG/ML
INJECTION, SOLUTION EPIDURAL; INFILTRATION; INTRACAUDAL; PERINEURAL AS NEEDED
Status: DISCONTINUED | OUTPATIENT
Start: 2022-05-24 | End: 2022-05-24

## 2022-05-24 RX ORDER — TRAMADOL HYDROCHLORIDE 50 MG/1
50 TABLET ORAL EVERY 6 HOURS PRN
Status: DISCONTINUED | OUTPATIENT
Start: 2022-05-24 | End: 2022-05-24 | Stop reason: HOSPADM

## 2022-05-24 RX ORDER — ALBUTEROL SULFATE 90 UG/1
AEROSOL, METERED RESPIRATORY (INHALATION) AS NEEDED
Status: DISCONTINUED | OUTPATIENT
Start: 2022-05-24 | End: 2022-05-24

## 2022-05-24 RX ORDER — LIDOCAINE HYDROCHLORIDE 10 MG/ML
0.5 INJECTION, SOLUTION EPIDURAL; INFILTRATION; INTRACAUDAL; PERINEURAL ONCE AS NEEDED
Status: DISCONTINUED | OUTPATIENT
Start: 2022-05-24 | End: 2022-05-24 | Stop reason: HOSPADM

## 2022-05-24 RX ORDER — OXYCODONE HYDROCHLORIDE 5 MG/1
5 TABLET ORAL EVERY 4 HOURS PRN
Qty: 15 TABLET | Refills: 0 | Status: SHIPPED | OUTPATIENT
Start: 2022-05-24

## 2022-05-24 RX ORDER — FENTANYL CITRATE/PF 50 MCG/ML
50 SYRINGE (ML) INJECTION
Status: DISCONTINUED | OUTPATIENT
Start: 2022-05-24 | End: 2022-05-24 | Stop reason: HOSPADM

## 2022-05-24 RX ORDER — PROPOFOL 10 MG/ML
INJECTION, EMULSION INTRAVENOUS CONTINUOUS PRN
Status: DISCONTINUED | OUTPATIENT
Start: 2022-05-24 | End: 2022-05-24

## 2022-05-24 RX ORDER — MIDAZOLAM HYDROCHLORIDE 2 MG/2ML
INJECTION, SOLUTION INTRAMUSCULAR; INTRAVENOUS AS NEEDED
Status: DISCONTINUED | OUTPATIENT
Start: 2022-05-24 | End: 2022-05-24

## 2022-05-24 RX ORDER — FENTANYL CITRATE 50 UG/ML
INJECTION, SOLUTION INTRAMUSCULAR; INTRAVENOUS AS NEEDED
Status: DISCONTINUED | OUTPATIENT
Start: 2022-05-24 | End: 2022-05-24

## 2022-05-24 RX ORDER — METOCLOPRAMIDE HYDROCHLORIDE 5 MG/ML
10 INJECTION INTRAMUSCULAR; INTRAVENOUS ONCE AS NEEDED
Status: DISCONTINUED | OUTPATIENT
Start: 2022-05-24 | End: 2022-05-24 | Stop reason: HOSPADM

## 2022-05-24 RX ORDER — SUCCINYLCHOLINE/SOD CL,ISO/PF 100 MG/5ML
SYRINGE (ML) INTRAVENOUS AS NEEDED
Status: DISCONTINUED | OUTPATIENT
Start: 2022-05-24 | End: 2022-05-24

## 2022-05-24 RX ORDER — ONDANSETRON 2 MG/ML
INJECTION INTRAMUSCULAR; INTRAVENOUS AS NEEDED
Status: DISCONTINUED | OUTPATIENT
Start: 2022-05-24 | End: 2022-05-24

## 2022-05-24 RX ORDER — ACETAMINOPHEN 325 MG/1
650 TABLET ORAL EVERY 6 HOURS PRN
Status: DISCONTINUED | OUTPATIENT
Start: 2022-05-24 | End: 2022-05-24 | Stop reason: HOSPADM

## 2022-05-24 RX ORDER — SODIUM CHLORIDE, SODIUM LACTATE, POTASSIUM CHLORIDE, CALCIUM CHLORIDE 600; 310; 30; 20 MG/100ML; MG/100ML; MG/100ML; MG/100ML
125 INJECTION, SOLUTION INTRAVENOUS CONTINUOUS
Status: DISCONTINUED | OUTPATIENT
Start: 2022-05-24 | End: 2022-05-24 | Stop reason: HOSPADM

## 2022-05-24 RX ORDER — HYDROMORPHONE HCL/PF 1 MG/ML
0.2 SYRINGE (ML) INJECTION
Status: DISCONTINUED | OUTPATIENT
Start: 2022-05-24 | End: 2022-05-24 | Stop reason: HOSPADM

## 2022-05-24 RX ORDER — ONDANSETRON 2 MG/ML
4 INJECTION INTRAMUSCULAR; INTRAVENOUS ONCE AS NEEDED
Status: DISCONTINUED | OUTPATIENT
Start: 2022-05-24 | End: 2022-05-24 | Stop reason: HOSPADM

## 2022-05-24 RX ORDER — LIDOCAINE HYDROCHLORIDE AND EPINEPHRINE 5; 5 MG/ML; UG/ML
INJECTION, SOLUTION INFILTRATION; PERINEURAL AS NEEDED
Status: DISCONTINUED | OUTPATIENT
Start: 2022-05-24 | End: 2022-05-24 | Stop reason: HOSPADM

## 2022-05-24 RX ORDER — MAGNESIUM HYDROXIDE 1200 MG/15ML
LIQUID ORAL AS NEEDED
Status: DISCONTINUED | OUTPATIENT
Start: 2022-05-24 | End: 2022-05-24 | Stop reason: HOSPADM

## 2022-05-24 RX ORDER — ASPIRIN 325 MG
325 TABLET, DELAYED RELEASE (ENTERIC COATED) ORAL DAILY
Qty: 30 TABLET | Refills: 0 | Status: SHIPPED | OUTPATIENT
Start: 2022-05-24

## 2022-05-24 RX ORDER — SODIUM CHLORIDE, SODIUM LACTATE, POTASSIUM CHLORIDE, CALCIUM CHLORIDE 600; 310; 30; 20 MG/100ML; MG/100ML; MG/100ML; MG/100ML
50 INJECTION, SOLUTION INTRAVENOUS CONTINUOUS
Status: DISCONTINUED | OUTPATIENT
Start: 2022-05-24 | End: 2022-05-24 | Stop reason: HOSPADM

## 2022-05-24 RX ORDER — DEXMEDETOMIDINE HYDROCHLORIDE 100 UG/ML
INJECTION, SOLUTION INTRAVENOUS AS NEEDED
Status: DISCONTINUED | OUTPATIENT
Start: 2022-05-24 | End: 2022-05-24

## 2022-05-24 RX ORDER — CEFAZOLIN SODIUM 2 G/50ML
2000 SOLUTION INTRAVENOUS ONCE
Status: COMPLETED | OUTPATIENT
Start: 2022-05-24 | End: 2022-05-24

## 2022-05-24 RX ORDER — DEXAMETHASONE SODIUM PHOSPHATE 10 MG/ML
INJECTION, SOLUTION INTRAMUSCULAR; INTRAVENOUS AS NEEDED
Status: DISCONTINUED | OUTPATIENT
Start: 2022-05-24 | End: 2022-05-24

## 2022-05-24 RX ORDER — PROPOFOL 10 MG/ML
INJECTION, EMULSION INTRAVENOUS AS NEEDED
Status: DISCONTINUED | OUTPATIENT
Start: 2022-05-24 | End: 2022-05-24

## 2022-05-24 RX ORDER — BUPIVACAINE HYDROCHLORIDE 2.5 MG/ML
INJECTION, SOLUTION EPIDURAL; INFILTRATION; INTRACAUDAL AS NEEDED
Status: DISCONTINUED | OUTPATIENT
Start: 2022-05-24 | End: 2022-05-24 | Stop reason: HOSPADM

## 2022-05-24 RX ADMIN — SODIUM CHLORIDE, SODIUM LACTATE, POTASSIUM CHLORIDE, AND CALCIUM CHLORIDE: .6; .31; .03; .02 INJECTION, SOLUTION INTRAVENOUS at 09:39

## 2022-05-24 RX ADMIN — DEXMEDETOMIDINE HYDROCHLORIDE 4 MCG: 100 INJECTION, SOLUTION INTRAVENOUS at 10:19

## 2022-05-24 RX ADMIN — DEXMEDETOMIDINE HYDROCHLORIDE 4 MCG: 100 INJECTION, SOLUTION INTRAVENOUS at 09:56

## 2022-05-24 RX ADMIN — DEXMEDETOMIDINE HYDROCHLORIDE 4 MCG: 100 INJECTION, SOLUTION INTRAVENOUS at 10:17

## 2022-05-24 RX ADMIN — PROPOFOL 50 MG: 10 INJECTION, EMULSION INTRAVENOUS at 09:45

## 2022-05-24 RX ADMIN — ALBUTEROL SULFATE 4 PUFF: 90 AEROSOL, METERED RESPIRATORY (INHALATION) at 10:14

## 2022-05-24 RX ADMIN — Medication 140 MG: at 09:20

## 2022-05-24 RX ADMIN — LIDOCAINE HYDROCHLORIDE 50 MG: 10 INJECTION, SOLUTION EPIDURAL; INFILTRATION; INTRACAUDAL; PERINEURAL at 09:19

## 2022-05-24 RX ADMIN — CEFAZOLIN SODIUM 2000 MG: 2 SOLUTION INTRAVENOUS at 09:11

## 2022-05-24 RX ADMIN — PROPOFOL 50 MCG/KG/MIN: 10 INJECTION, EMULSION INTRAVENOUS at 09:32

## 2022-05-24 RX ADMIN — FENTANYL CITRATE 25 MCG: 50 INJECTION, SOLUTION INTRAMUSCULAR; INTRAVENOUS at 09:44

## 2022-05-24 RX ADMIN — PROPOFOL 20 MG: 10 INJECTION, EMULSION INTRAVENOUS at 09:42

## 2022-05-24 RX ADMIN — PROPOFOL 250 MG: 10 INJECTION, EMULSION INTRAVENOUS at 09:19

## 2022-05-24 RX ADMIN — SODIUM CHLORIDE, SODIUM LACTATE, POTASSIUM CHLORIDE, AND CALCIUM CHLORIDE: .6; .31; .03; .02 INJECTION, SOLUTION INTRAVENOUS at 08:22

## 2022-05-24 RX ADMIN — DEXAMETHASONE SODIUM PHOSPHATE 10 MG: 10 INJECTION, SOLUTION INTRAMUSCULAR; INTRAVENOUS at 09:20

## 2022-05-24 RX ADMIN — PROPOFOL 50 MG: 10 INJECTION, EMULSION INTRAVENOUS at 09:21

## 2022-05-24 RX ADMIN — FENTANYL CITRATE 25 MCG: 50 INJECTION, SOLUTION INTRAMUSCULAR; INTRAVENOUS at 09:42

## 2022-05-24 RX ADMIN — FENTANYL CITRATE 50 MCG: 50 INJECTION, SOLUTION INTRAMUSCULAR; INTRAVENOUS at 09:13

## 2022-05-24 RX ADMIN — MIDAZOLAM 2 MG: 1 INJECTION INTRAMUSCULAR; INTRAVENOUS at 09:13

## 2022-05-24 RX ADMIN — ONDANSETRON 4 MG: 2 INJECTION INTRAMUSCULAR; INTRAVENOUS at 09:13

## 2022-05-24 NOTE — DISCHARGE INSTRUCTIONS
POSTOPERATIVE INSTRUCTIONS following KNEE SURGERY    MEDICATIONS:  Resume all home medications unless otherwise instructed by your surgeon  Pain Medication:  Oxycodone 5 mg, 1 tablet every 4 hours as needed  If you were given a regional anesthetic (nerve block), please begin taking the pain medication as soon as you get home, even if you have minimal or no pain  DO NOT WAIT FOR THE NERVE BLOCK TO WEAR OFF  Possible side effects include nausea, constipation, and urinary retention  If you experience these side effects, please call our office for assistance  Pain med refills are authorized only during office hours (8am-4pm, Mon-Fri)  Anti-Inflammatory:  Ibuprofen 600 mg, 1 tablet every 8 hours for 4 weeks and Tylenol 325 mg, 1-2 tablets every 6 hours for 4 weeks  Take with food  Stop if you experience nausea, reflux, or stomach pain  Nausea Medication:  None  Fill prescription ONLY if you expericnce severe nausea  Blood Clot Prevention:  Aspirin 325 mg, 1 tablet daily for 3 weeks  Pump your foot up and down 20 times per hour while you are less mobile  WOUND CARE:  Keep the dressing clean and dry  Light drainage may occur the first 2 days postop  You may remove the dressings and get the incision wet in the shower 72 hours after surgery  DO NOT remove steri-strips or sutures  DO NOT immerse the incision under water  Carefully pat the incision dry  If there is wound drainage, re-apply a fresh dry gauze dressing  Please call our office ([phone]) if you experience either of the following:  Sudden increase in swelling, redness, or warmth at the surgical site  Excessive incisional drainage that persists beyond the 3rd day after surgery  Oral temperature greater than 101 degrees, not relieved with Tylenol  Shortness of breath, chest pain, nausea, or any other concerning symptoms    SWELLING CONTROL:  Cold Therapy:   The cold therapy device may be used either continuously or only as needed, according to your preference  Do not let the pad directly touch your skin  Alternatively, apply ice (20 min on, 20 min off) as often as you feel is necessary  Elevation:  Elevate the entire leg above heart level  Place pillows under your ankle to keep your knee straight  Compression:  Apply ACE wraps or a thigh-length compression stocking as needed  RANGE OF MOTION:  You are allowed FULL RANGE OF MOTION as tolerated  IMMOBILIZATION:  None  You are allowed full range of motion as tolerated  ACTIVITY:   BEAR FULL WEIGHT AS TOLERATED on the operative leg  Use crutches to assist only as needed  Using Crutches on Stairs:  Going up, lead with your "good" (nonoperative) leg  Going down, lead with your "bad" (operative) leg  Use a hand rail when available  Knee Extension:  Place a rolled towel or pillow under your ankle for 20-30 minutes 3-5 times per day  This will help to maintain full knee extension  Quad Sets:  Sit or lie with your knee straight  Tighten your quadriceps (front thigh) muscle  Hold for 3 seconds, then relax  Repeat 20 times per hour while awake  PHYSICAL THERAPY:  Begin therapy 5 TO 7 DAYS AFTER SURGERY  You were given a prescription for therapy at your preoperative office visit  If you do not have physical therapy scheduled yet, please call our office for assistance  FOLLOW-UP APPOINTMENT:  7-10 days after surgery with:    [facility]  [address] [place] [address], [place], [address] [facility]  [phone] ([facility])  [phone] (After-Hours)

## 2022-05-24 NOTE — ANESTHESIA PREPROCEDURE EVALUATION
Procedure:  ARTHROSCOPIC MENISCECTOMY LATERAL /MEDIAL (Right Knee)    Relevant Problems   CARDIO   (+) Chronic migraine without aura, not intractable   (+) Other hyperlipidemia      GI/HEPATIC   (+) Gastroesophageal reflux disease without esophagitis      MUSCULOSKELETAL   (+) Chronic low back pain without sciatica   (+) Chronic midline low back pain with sciatica   (+) Fibromyalgia   (+) Lateral epicondylitis of both elbows   (+) Primary generalized (osteo)arthritis      NEURO/PSYCH   (+) Anxiety   (+) Anxiety and depression   (+) Chronic low back pain without sciatica   (+) Chronic midline low back pain with sciatica   (+) Chronic migraine without aura, not intractable   (+) Fibromyalgia      PULMONARY   (+) Asthma      Cardiovascular and Mediastinum   (+) Aneurysm (HCC)      Other   (+) Hyperglycemia   (+) Atkinson syndrome   (+) Morbid obesity with BMI of 40 0-44 9, adult (HCC)        Physical Exam    Airway    Mallampati score: IV  TM Distance: >3 FB  Neck ROM: full     Dental   No notable dental hx     Cardiovascular      Pulmonary      Other Findings        Anesthesia Plan  ASA Score- 3     Anesthesia Type- general with ASA Monitors  Additional Monitors:   Airway Plan: ETT  Plan Factors-Exercise tolerance (METS): >4 METS  Chart reviewed  EKG reviewed  Existing labs reviewed  Patient summary reviewed  Patient is not a current smoker  Patient did not smoke on day of surgery  Induction- intravenous  Postoperative Plan- Plan for postoperative opioid use  Informed Consent- Anesthetic plan and risks discussed with patient  I personally reviewed this patient with the CRNA  Discussed and agreed on the Anesthesia Plan with the CRNA  [facility]

## 2022-05-24 NOTE — OP NOTE
OPERATIVE REPORT  PATIENT NAME: Miguel Chacon    :  1980  MRN: [id number]  Pt Location:  OR ROOM 12    SURGERY DATE: 2022    Surgeon(s) and Role:     * Alysia uDbon DO - Primary     * [address] [place] [address] - Assisting     * Carol Ann Arredondo MD - Assisting    Preop Diagnosis:  Other tear of medial meniscus, current injury, right knee, initial encounter [S83 241A]  Pre-op testing [Z01 818]    Post-Op Diagnosis Codes:     * Other tear of medial meniscus, current injury, right knee, initial encounter [X40 225P]     * Pre-op testing [Z01 818]    Procedure(s) (LRB):  ARTHROSCOPIC MENISCECTOMY MEDIAL (Right)    Specimen(s):  * No specimens in log *    Estimated Blood Loss:   Minimal    Drains:  * No LDAs found *    Anesthesia Type:   General/LMA    Operative Indications: Other tear of medial meniscus, current injury, right knee, initial encounter [S83 241A]  Pre-op testing [B70 325]      Complications:   None    Procedure and Technique:      Pre-operative Diagnosis: Right knee medial meniscus complex tear     Post-operative Diagnosis: Right knee medial meniscus complex tear     Operation:  Surgical arthroscopy of the Right knee with partial medial meniscectomy,       Indications: Ms Mahad Marroquin is a 43 y o  female with a medial meniscus tear  An MRI was obtained a revealed a tear of the Right medial meniscus  Due to the patient's MRI findings, active lifestyle, and lack of improvement with a conservative approach, it was recommended that they proceed forward with arthroscopic surgical management of this problem  We reviewed risks and benefits of surgery and a decision was made to proceed with surgery to address the torn medial meniscus  Findings:       Examination under anesthesia of the operative Right knee revealed a range of motion of 0-130 degrees  Posterior drawer testing was negative  Lachman testing was negative   Pivot shift testing was negative,  Collateral ligament stability testing revealed no laxity with valgus or varus stresses  With respect to posterolateral corner testing, dial testing at 30 and at 90 degrees was symmetric to the contralateral knee  Arthroscopic evaluation of the knee revealed the following:     Medial meniscus: There was a complex, multidirectional tear of the medial meniscus      Medial femoral condyle:Grade III chondral defects  Medial tibial plateau: Grade  0 chondral defects  Anterior cruciate ligament: Normal appearance  Posterior cruciate ligament: Normal appearance  Lateral meniscus: No tears  Lateral femoral condyle: Grade II chondral defects  Lateral tibial plateau: GradeII chondral defects  Medial and lateral gutters: No loose bodies  Patella: Grade II chondral defects  Trochlea: Grade III chondral defects  Medial plica: No significant plica was present             Procedure: In the pre-operative holding area, the patient identified the correct operative extremity and I marked that extremity with my initials, using a permanent marker  The patient was brought to the operating room and positioned supine  Following satisfactory induction of anesthesia, the Right knee was prepped and draped in the usual sterile fashion for surgical arthroscopy of the Right knee  Before any surgical instrumentation was passed to me by the surgical technician, a formalized time-out occurred, which involves the surgeon, circulating nurse, and anesthesia staff all verifying the correct operative extremity  My initials were visible on the prepped and draped operative field  The anatomic landmarks of the anteromedial and anterolateral portals were marked  The anterolateral portal was established with a scalpel  The arthroscope was introduced through this portal  Under direct visualization, the anteromedial portal was established with a localizing needle followed by a scalpel   A probe was then introduced into the anteromedial portal  A systematic diagnostic arthroscopy evaluated the following:  medial compartment, notch, lateral compartment, patellofemoral compartment, medial gutter, and lateral gutter  A complex medial meniscus tear was noted  This tear was associated with meniscal fragments that were grossly unstable to probing  The medial meniscus tear was debrided to a stable base, using the arthroscopic biters and motorized shaver  There was no additional pathology  All particulate debris was removed  The knee was copiously rinsed and then drained  The portals were closed with an interrupted 4-0 monocryl absorbable suture  The skin was cleansed with sterile saline and dried before Steri-Strips were applied  Finally, a sterile dressing was secured by Webril and an Ace wrap        I was present for the entire procedure, A qualified resident physician was not available and A physician assistant was required during the procedure for retraction tissue handling,dissection and suturing    Patient Disposition:  PACU       SIGNATURE: Ken Davis DO  DATE: May 24, 2022  TIME: 10:24 AM

## 2022-05-24 NOTE — ANESTHESIA POSTPROCEDURE EVALUATION
Post-Op Assessment Note    CV Status:  Stable  Pain Score: 0    Pain management: satisfactory to patient     Mental Status:  Sleepy and arousable   Hydration Status:  Euvolemic   PONV Controlled:  Controlled   Airway Patency:  Patent  Airway: intubated      Post Op Vitals Reviewed: Yes      Staff: CRNA, Anesthesiologist         No complications documented      /90 (05/24/22 1030)    Temp 98 1 °F (36 7 °C) (05/24/22 1030)    Pulse 86 (05/24/22 1030)   Resp 20 (05/24/22 1030)    SpO2 99 % (05/24/22 1030)

## 2022-06-02 ENCOUNTER — OFFICE VISIT (OUTPATIENT)
Dept: OBGYN CLINIC | Facility: MEDICAL CENTER | Age: 42
End: 2022-06-02

## 2022-06-02 VITALS
BODY MASS INDEX: 43.43 KG/M2 | HEIGHT: 62 IN | DIASTOLIC BLOOD PRESSURE: 80 MMHG | SYSTOLIC BLOOD PRESSURE: 124 MMHG | WEIGHT: 236 LBS

## 2022-06-02 DIAGNOSIS — Z98.890 S/P MEDIAL MENISCECTOMY OF RIGHT KNEE: Primary | ICD-10-CM

## 2022-06-02 PROCEDURE — 99024 POSTOP FOLLOW-UP VISIT: CPT | Performed by: ORTHOPAEDIC SURGERY

## 2022-06-02 NOTE — PATIENT INSTRUCTIONS
Ice to knee for 20 minutes at least 1-2 times daily  PT for ROM/strengthening to knee, hip and core    Work note written  Hold off on swimming until 3 weeks from surgery

## 2022-06-02 NOTE — LETTER
June 2, 2022     Patient: Katerina Gan  YOB: 1980  Date of Visit: 6/2/2022      To Whom it May Concern:    Jesus Yung is under my professional care  Pollo Loredo was seen in my office on 6/2/2022  Pollo Loredo may return back to work with no restrictions  If you have any questions or concerns, please don't hesitate to call           Sincerely,          Bibiana Sandhu,         CC: No Recipients

## 2022-06-02 NOTE — PROGRESS NOTES
Knee Post Operative Visit     Assesment:     43 y o  female 9 days s/p surgical arthroscopy of the right knee with medial meniscectomy, DOS: 5/24/22, doing well    Plan:    Post-Operative treatment:    Ice to knee for 20 minutes at least 1-2 times daily  PT for ROM/strengthening to knee, hip and core  Work note written  Hold off on swimming until 3 weeks from surgery     Imaging: All imaging from today was reviewed by myself and explained to the patient  Weight bearing:  as tolerated     ROM:  Full    Brace:  No brace needed    DVT Prophylaxis:  Aspirin 325 mg oral twice daily x 3 weeks    Follow up:   6 weeks    Patient was advised that if they have any fevers, chills, chest pain, shortness of breath, redness or drainage from the incision, please let our office know immediately  Chief Complaint   Patient presents with    Right Knee - Post-op     History of Present Illness: The patient is a 43 y o  female who is being evaluated post operatively 9 days s/p surgical arthroscopy of the right knee with medial meniscectomy, DOS: 5/24/22    Since the prior visit, She reports significant improvement  Patient has no pain along the medial aspect of the knee  Pain is well controlled  The patient is using ice to control swelling  They have started physical therapy  The patient Aspirin 325 mg oral twice daily x 3 weeks for DVT ppx  The patient has been ambulating without crutches  The patient has been ambulating wihtout a brace  The patient denies any fevers, chills, calf pain, chest pain/shortness of breath, redness or drainage from the incision  I have reviewed the past medical, surgical, social and family history, medications and allergies as documented in the EMR  Review of systems: ROS is negative other than that noted in the HPI  Constitutional: Negative for fatigue and fever        Physical Exam:    Blood pressure 124/80, height 5' 2" (1 575 m), weight 107 kg (236 lb)     General/Constitutional: NAD, well developed, well nourished  HENT: Normocephalic, atraumatic  CV: Intact distal pulses, regular rate  Resp: No respiratory distress or labored breathing  Lymphatic: No lymphadenopathy palpated  Neuro: Alert and Oriented x 3, no focal deficits  Psych: Normal mood, normal affect, normal judgement, normal behavior  Skin: Warm, dry, no rashes, no erythema      Knee Exam (focused):              RIGHT LEFT   ROM:   0-130 0-130   Palpation: Effusion negative negative     MJL tenderness Negative Negative     LJL tenderness Negative Negative   Instability: Varus stable stable     Valgus stable stable   Special Tests: Lachman Negative Negative     Posterior drawer Negative Negative     Anterior drawer Negative Negative     Pivot shift not tested not tested     Dial not tested not tested   Patella: Palpation no tenderness no tenderness     Mobility 1/4 1/4     Apprehension Negative Negative   Other: Single leg 1/4 squat not tested not tested      Incisions show no erythema, no drainage    LE NV Exam: +2 DP/PT pulses bilaterally  Sensation intact to light touch L2-S1 bilaterally     Bilateral hip ROM demonstrates no pain actively or passively    No calf tenderness to palpation bilaterally    Scribe Attestation    I,:  Anayeli Mayer am acting as a scribe while in the presence of the attending physician :       I,:  Aleksandra Hughes, DO personally performed the services described in this documentation    as scribed in my presence :

## 2022-07-14 ENCOUNTER — OFFICE VISIT (OUTPATIENT)
Dept: OBGYN CLINIC | Facility: MEDICAL CENTER | Age: 42
End: 2022-07-14

## 2022-07-14 VITALS
DIASTOLIC BLOOD PRESSURE: 85 MMHG | WEIGHT: 239.4 LBS | SYSTOLIC BLOOD PRESSURE: 125 MMHG | BODY MASS INDEX: 42.42 KG/M2 | HEIGHT: 63 IN

## 2022-07-14 DIAGNOSIS — Z98.890 S/P MEDIAL MENISCECTOMY OF RIGHT KNEE: Primary | ICD-10-CM

## 2022-07-14 PROCEDURE — 99024 POSTOP FOLLOW-UP VISIT: CPT | Performed by: ORTHOPAEDIC SURGERY

## 2022-07-14 NOTE — PROGRESS NOTES
Knee Post Operative Visit     Assesment:     43 y o  female 7 weeks s/p surgical arthroscopy of the right knee with medial meniscectomy, DOS: 5/24/22, doing well    Plan: Activities as tolerated    F/u as needed    Patient was advised that if they have any fevers, chills, chest pain, shortness of breath, redness or drainage from the incision, please let our office know immediately  Chief Complaint   Patient presents with    Right Knee - Post-op     History of Present Illness: The patient is a 43 y o  female who is being evaluated post operatively 9 days s/p surgical arthroscopy of the right knee with medial meniscectomy, DOS: 5/24/22    She is doing well with no issues  No complaint of pain or instability      The patient has been ambulating wihtout a brace  The patient denies any fevers, chills, calf pain, chest pain/shortness of breath, redness or drainage from the incision  I have reviewed the past medical, surgical, social and family history, medications and allergies as documented in the EMR  Review of systems: ROS is negative other than that noted in the HPI  Constitutional: Negative for fatigue and fever  Physical Exam:    Blood pressure 125/85, height 5' 2 75" (1 594 m), weight 109 kg (239 lb 6 4 oz)  General/Constitutional: NAD, well developed, well nourished  HENT: Normocephalic, atraumatic  CV: Intact distal pulses, regular rate  Resp: No respiratory distress or labored breathing  Lymphatic: No lymphadenopathy palpated  Neuro: Alert and Oriented x 3, no focal deficits  Psych: Normal mood, normal affect, normal judgement, normal behavior  Skin: Warm, dry, no rashes, no erythema      Knee Exam (focused):              RIGHT LEFT   ROM:   0-130 0-130   Palpation: Effusion negative negative     MJL tenderness Negative Negative     LJL tenderness Negative Negative   Instability: Varus stable stable     Valgus stable stable   Special Tests: Lachman Negative Negative     Posterior drawer Negative Negative     Anterior drawer Negative Negative     Pivot shift not tested not tested     Dial not tested not tested   Patella: Palpation no tenderness no tenderness     Mobility 1/4 1/4     Apprehension Negative Negative   Other: Single leg 1/4 squat not tested not tested      Incisions show no erythema, no drainage    LE NV Exam: +2 DP/PT pulses bilaterally  Sensation intact to light touch L2-S1 bilaterally     Bilateral hip ROM demonstrates no pain actively or passively    No calf tenderness to palpation bilaterally    Scribe Attestation    I,:   am acting as a scribe while in the presence of the attending physician :       I,:   personally performed the services described in this documentation    as scribed in my presence :

## 2022-08-03 DIAGNOSIS — J45.20 MILD INTERMITTENT ASTHMA, UNSPECIFIED WHETHER COMPLICATED: ICD-10-CM

## 2022-08-03 RX ORDER — ALBUTEROL SULFATE 90 UG/1
AEROSOL, METERED RESPIRATORY (INHALATION)
Qty: 17 G | Refills: 0 | Status: SHIPPED | OUTPATIENT
Start: 2022-08-03 | End: 2022-10-10

## 2022-08-22 DIAGNOSIS — F41.9 ANXIETY: ICD-10-CM

## 2022-10-05 DIAGNOSIS — J30.9 ALLERGIC RHINITIS, UNSPECIFIED SEASONALITY, UNSPECIFIED TRIGGER: ICD-10-CM

## 2022-10-05 RX ORDER — MONTELUKAST SODIUM 10 MG/1
TABLET ORAL
Qty: 90 TABLET | Refills: 0 | Status: SHIPPED | OUTPATIENT
Start: 2022-10-05

## 2022-10-10 DIAGNOSIS — J45.20 MILD INTERMITTENT ASTHMA, UNSPECIFIED WHETHER COMPLICATED: ICD-10-CM

## 2022-10-10 RX ORDER — ALBUTEROL SULFATE 90 UG/1
AEROSOL, METERED RESPIRATORY (INHALATION)
Qty: 17 G | Refills: 4 | Status: SHIPPED | OUTPATIENT
Start: 2022-10-10

## 2022-10-12 PROBLEM — Z00.00 HEALTHCARE MAINTENANCE: Status: RESOLVED | Noted: 2021-01-15 | Resolved: 2022-10-12

## 2022-11-21 DIAGNOSIS — F41.9 ANXIETY: ICD-10-CM

## 2022-12-06 DIAGNOSIS — F41.9 ANXIETY: ICD-10-CM

## 2022-12-15 PROBLEM — M72.2 PLANTAR FASCIITIS, LEFT: Status: ACTIVE | Noted: 2022-12-15

## 2022-12-21 ENCOUNTER — OFFICE VISIT (OUTPATIENT)
Dept: FAMILY MEDICINE CLINIC | Facility: CLINIC | Age: 42
End: 2022-12-21

## 2022-12-21 VITALS
TEMPERATURE: 97.6 F | RESPIRATION RATE: 16 BRPM | WEIGHT: 237 LBS | HEART RATE: 96 BPM | BODY MASS INDEX: 41.99 KG/M2 | HEIGHT: 63 IN | DIASTOLIC BLOOD PRESSURE: 84 MMHG | OXYGEN SATURATION: 96 % | SYSTOLIC BLOOD PRESSURE: 138 MMHG

## 2022-12-21 DIAGNOSIS — J45.20 MILD INTERMITTENT ASTHMA WITHOUT COMPLICATION: ICD-10-CM

## 2022-12-21 DIAGNOSIS — F32.A ANXIETY AND DEPRESSION: ICD-10-CM

## 2022-12-21 DIAGNOSIS — Z00.01 ABNORMAL WELLNESS EXAM: ICD-10-CM

## 2022-12-21 DIAGNOSIS — F41.9 ANXIETY AND DEPRESSION: ICD-10-CM

## 2022-12-21 DIAGNOSIS — K21.9 GASTROESOPHAGEAL REFLUX DISEASE WITHOUT ESOPHAGITIS: ICD-10-CM

## 2022-12-21 DIAGNOSIS — F41.9 ANXIETY: ICD-10-CM

## 2022-12-21 DIAGNOSIS — E78.49 OTHER HYPERLIPIDEMIA: ICD-10-CM

## 2022-12-21 DIAGNOSIS — R73.9 HYPERGLYCEMIA: Primary | ICD-10-CM

## 2022-12-21 DIAGNOSIS — E66.01 MORBID OBESITY WITH BMI OF 40.0-44.9, ADULT (HCC): ICD-10-CM

## 2022-12-21 DIAGNOSIS — J30.9 ALLERGIC RHINITIS, UNSPECIFIED SEASONALITY, UNSPECIFIED TRIGGER: ICD-10-CM

## 2022-12-21 RX ORDER — MONTELUKAST SODIUM 10 MG/1
10 TABLET ORAL EVERY EVENING
Qty: 90 TABLET | Refills: 1 | Status: SHIPPED | OUTPATIENT
Start: 2022-12-21

## 2022-12-21 NOTE — PROGRESS NOTES
Name: Mackenzie Porter      : 1980      MRN: [id number]  Encounter Provider: Rakan Lopez MD  Encounter Date: 2022   Encounter department: [address] [place] [address]  Hyperglycemia  Assessment & Plan:  Discussed about low-carb diet and regular exercise  Continue to monitor A1c and fasting glucose  Orders:  -     Hemoglobin A1C; Future    2  Other hyperlipidemia  Assessment & Plan:  Not well controlled  Discussed about low-fat diet and regular exercise  We will continue to monitor fasting lipid profile  Orders:  -     CBC and differential; Future  -     Comprehensive metabolic panel; Future  -     Lipid Panel with Direct LDL reflex; Future  -     TSH, 3rd generation with Free T4 reflex; Future    3  Anxiety  Assessment & Plan:  Well-controlled on sertraline  Continue same  We will continue to monitor  Orders:  -     sertraline (ZOLOFT) 50 mg tablet; Take 1 5 tablets (75 mg total) by mouth daily    4  Allergic rhinitis, unspecified seasonality, unspecified trigger  Comments:  She was given refills  Assessment & Plan:  Stable  Was given refills for Singulair  Orders:  -     montelukast (SINGULAIR) 10 mg tablet; Take 1 tablet (10 mg total) by mouth every evening    5  Gastroesophageal reflux disease without esophagitis  Assessment & Plan:  Well-controlled on famotidine and omeprazole  Continue same  Continue to monitor  6  Mild intermittent asthma without complication  Assessment & Plan:  Stable  Continue on albuterol as needed  7  Anxiety and depression  Assessment & Plan:  Well-controlled on sertraline  Continue same  We will continue to monitor  8  Abnormal wellness exam  Assessment & Plan: It was discussed about immunizations, diet, exercise and safety measures        9  Morbid obesity with BMI of 40 0-44 9, adult ([facility] [address] )           Subjective     Is here today for follow-up multiple medical problems and wellness exam  She has been taking her medications  She denies any side effects  She has been following with rheumatologist for her myalgia  She is due for blood work  Her last blood work showed elevated cholesterol  Review of Systems   Constitutional: Negative for chills and fever  HENT: Negative for trouble swallowing  Eyes: Negative for visual disturbance  Respiratory: Negative for cough and shortness of breath  Cardiovascular: Negative for chest pain, palpitations and leg swelling  Gastrointestinal: Negative for abdominal pain, constipation and diarrhea  Endocrine: Negative for cold intolerance and heat intolerance  Genitourinary: Negative for difficulty urinating and dysuria  Musculoskeletal: Positive for myalgias  Negative for gait problem  Skin: Negative for rash  Neurological: Negative for dizziness, tremors, seizures and headaches  Hematological: Negative for adenopathy  Psychiatric/Behavioral: Negative for behavioral problems         Past Medical History:   Diagnosis Date   • Allergy    • Anxiety and depression    • Asthma    • Fibromyalgia, primary    • Atkinson syndrome    • Morbid obesity with BMI of 40 0-44 9, adult [facility])      Past Surgical History:   Procedure Laterality Date   • CARPAL TUNNEL RELEASE Bilateral    •  SECTION      2 C sections   • COLONOSCOPY     • EGD     • MA KNEE SCOPE,MED/LAT MENISECTOMY Right 2022    Procedure: ARTHROSCOPIC MENISCECTOMY MEDIAL;  Surgeon: Jackson Goodell, DO;  Location:  MAIN OR;  Service: Orthopedics   • REDUCTION MAMMAPLASTY Bilateral 2018   • TONSILLECTOMY     • TOTAL ABDOMINAL HYSTERECTOMY      onset: 7/15/14     Family History   Problem Relation Age of Onset   • Alcohol abuse Mother    • Anemia Mother    • Diverticulitis Mother    • Alcohol abuse Father    • Colon cancer Father 43   • Pancreatic cancer Father    • Hypertension Father    • Other Father 54        atkinson syndrome   • Breast cancer Maternal Grandmother    • Prostate [Patient] : patient cancer Maternal Grandfather    • Diabetes type II Family    • Cerebral aneurysm Family      Social History     Socioeconomic History   • Marital status: /Civil Union     Spouse name: None   • Number of children: None   • Years of education: None   • Highest education level: None   Occupational History   • None   Tobacco Use   • Smoking status: Never   • Smokeless tobacco: Never   • Tobacco comments:     no passive smoke exposure   Vaping Use   • Vaping Use: Never used   Substance and Sexual Activity   • Alcohol use: Yes     Comment: socially   • Drug use: Never   • Sexual activity: Not Currently   Other Topics Concern   • None   Social History Narrative   • None     Social Determinants of Health     Financial Resource Strain: Not on file   Food Insecurity: Not on file   Transportation Needs: Not on file   Physical Activity: Not on file   Stress: Not on file   Social Connections: Not on file   Intimate Partner Violence: Not on file   Housing Stability: Not on file     Current Outpatient Medications on File Prior to Visit   Medication Sig   • albuterol (PROVENTIL HFA,VENTOLIN HFA) 90 mcg/act inhaler USE 2 INHALATIONS EVERY 6 HOURS AS NEEDED FOR WHEEZING   • famotidine (PEPCID) 40 MG tablet TAKE 1 TABLET DAILY AT BEDTIME   • fexofenadine (ALLEGRA) 180 MG tablet Take 180 mg by mouth   • LORazepam (ATIVAN) 0 5 mg tablet Take 1 tablet (0 5 mg total) by mouth daily as needed for anxiety   • omeprazole (PriLOSEC) 40 MG capsule 2 (two) times a day   • [DISCONTINUED] montelukast (SINGULAIR) 10 mg tablet TAKE 1 TABLET EVERY EVENING   • [DISCONTINUED] sertraline (ZOLOFT) 50 mg tablet TAKE ONE AND ONE-HALF TABLET DAILY   • aspirin (ECOTRIN) 325 mg EC tablet Take 1 tablet (325 mg total) by mouth in the morning   (Patient not taking: Reported on 6/23/2022)   • estradiol (ESTRACE) 0 5 MG tablet Take 0 5 mg by mouth daily   • naproxen (NAPROSYN) 500 mg tablet TAKE 1 TABLET TWICE A DAY WITH MEALS   • oxyCODONE (ROXICODONE) 5 immediate release tablet Take 1 tablet (5 mg total) by mouth every 4 (four) hours as needed for moderate pain for up to 15 doses Max Daily Amount: 30 mg (Patient not taking: Reported on 6/23/2022)   • phentermine 37 5 MG capsule Take 1 capsule (37 5 mg total) by mouth every morning (Patient not taking: No sig reported)   • PREMARIN 0 625 MG tablet  (Patient not taking: No sig reported)   • topiramate (TOPAMAX) 25 mg tablet Take 1 tablet (25 mg total) by mouth 2 (two) times a day (Patient not taking: No sig reported)     Allergies   Allergen Reactions   • Fluticasone Other (See Comments)     Bloody nose   • Pollen Extract Other (See Comments)     Other reaction(s): Other (See Comments)  SNEEZING SINUS PROBLEMS     Immunization History   Administered Date(s) Administered   • COVID-19 MODERNA VACC 0 5 ML IM 01/27/2021, 02/24/2021, 10/28/2021   • COVID-19 Pfizer Vac BIVALENT Sridhar-sucrose 12 Yr+ IM (BOOSTER ONLY) 11/11/2022   • DT (pediatric) 1980, 1980, 1980, 12/09/1981, 06/04/2002   • H1N1, All Formulations 10/31/2009   • INFLUENZA 12/07/2006, 11/14/2009, 03/06/2014, 10/09/2017, 10/18/2021   • IPV 1980   • Influenza Split 11/15/2013   • Influenza, injectable, quadrivalent, preservative free 0 5 mL 10/14/2019, 10/16/2020   • MMR 11/04/1981, 08/06/1994   • OPV 1980, 1980   • Td (adult), adsorbed 10/10/2011       Objective     /84 (BP Location: Left arm, Patient Position: Sitting, Cuff Size: Large)   Pulse 96   Temp 97 6 °F (36 4 °C) (Tympanic)   Resp 16   Ht 5' 2 75" (1 594 m)   Wt 108 kg (237 lb)   SpO2 96%   BMI 42 32 kg/m²     Physical Exam  Vitals and nursing note reviewed  Constitutional:       Appearance: She is well-developed  HENT:      Head: Normocephalic and atraumatic  Eyes:      Pupils: Pupils are equal, round, and reactive to light  Cardiovascular:      Rate and Rhythm: Normal rate and regular rhythm  Heart sounds: Normal heart sounds  Pulmonary:      Effort: Pulmonary effort is normal       Breath sounds: Normal breath sounds  Abdominal:      General: Bowel sounds are normal       Palpations: Abdomen is soft  Musculoskeletal:         General: Normal range of motion  Cervical back: Normal range of motion and neck supple  Lymphadenopathy:      Cervical: No cervical adenopathy  Skin:     General: Skin is warm  Neurological:      Mental Status: She is alert and oriented to person, place, and time  Cranial Nerves: No cranial nerve deficit  Collin Benites MD BMI Counseling: Body mass index is 42 32 kg/m²  The BMI is above normal  Nutrition recommendations include reducing portion sizes, decreasing overall calorie intake and 3-5 servings of fruits/vegetables daily  Exercise recommendations include moderate aerobic physical activity for 150 minutes/week

## 2022-12-21 NOTE — PROGRESS NOTES
Name: Welford Bence      : 1980      MRN: [id number]  Encounter Provider: Telma Clements MD  Encounter Date: 2022   Encounter department: [address] [place] [address]  Hyperglycemia  Assessment & Plan:  Discussed about low-carb diet and regular exercise  Continue to monitor A1c and fasting glucose  Orders:  -     Hemoglobin A1C; Future    2  Other hyperlipidemia  Assessment & Plan:  Not well controlled  Discussed about low-fat diet and regular exercise  We will continue to monitor fasting lipid profile  Orders:  -     CBC and differential; Future  -     Comprehensive metabolic panel; Future  -     Lipid Panel with Direct LDL reflex; Future  -     TSH, 3rd generation with Free T4 reflex; Future    3  Anxiety  Assessment & Plan:  Well-controlled on sertraline  Continue same  We will continue to monitor  Orders:  -     sertraline (ZOLOFT) 50 mg tablet; Take 1 5 tablets (75 mg total) by mouth daily    4  Allergic rhinitis, unspecified seasonality, unspecified trigger  Comments:  She was given refills  Assessment & Plan:  Stable  Was given refills for Singulair  Orders:  -     montelukast (SINGULAIR) 10 mg tablet; Take 1 tablet (10 mg total) by mouth every evening    5  Gastroesophageal reflux disease without esophagitis  Assessment & Plan:  Well-controlled on famotidine and omeprazole  Continue same  Continue to monitor  6  Mild intermittent asthma without complication  Assessment & Plan:  Stable  Continue on albuterol as needed  7  Anxiety and depression  Assessment & Plan:  Well-controlled on sertraline  Continue same  We will continue to monitor  8  Abnormal wellness exam  Assessment & Plan: It was discussed about immunizations, diet, exercise and safety measures        9  Morbid obesity with BMI of 40 0-44 9, adult ([facility] [address] )           Subjective     Is here today for follow-up multiple medical problems and wellness exam  She has been taking her medications  She denies any side effects  She has been following with rheumatologist for her myalgia  She is due for blood work  Her last blood work showed elevated cholesterol  Anxiety  Patient reports no chest pain, dizziness, palpitations or shortness of breath  Review of Systems   Constitutional: Negative for chills and fever  HENT: Negative for trouble swallowing  Eyes: Negative for visual disturbance  Respiratory: Negative for cough and shortness of breath  Cardiovascular: Negative for chest pain, palpitations and leg swelling  Gastrointestinal: Negative for abdominal pain, constipation and diarrhea  Endocrine: Negative for cold intolerance and heat intolerance  Genitourinary: Negative for difficulty urinating and dysuria  Musculoskeletal: Positive for myalgias  Negative for gait problem  Skin: Negative for rash  Neurological: Negative for dizziness, tremors, seizures and headaches  Hematological: Negative for adenopathy  Psychiatric/Behavioral: Negative for behavioral problems         Past Medical History:   Diagnosis Date   • Allergy    • Anxiety and depression    • Asthma    • Fibromyalgia, primary    • Atkinson syndrome    • Morbid obesity with BMI of 40 0-44 9, adult [facility])      Past Surgical History:   Procedure Laterality Date   • CARPAL TUNNEL RELEASE Bilateral    •  SECTION      2 C sections   • COLONOSCOPY     • EGD     • [place] KNEE SCOPE,MED/LAT MENISECTOMY Right 2022    Procedure: ARTHROSCOPIC MENISCECTOMY MEDIAL;  Surgeon: Nicolás Stark DO;  Location:  MAIN OR;  Service: Orthopedics   • REDUCTION MAMMAPLASTY Bilateral 2018   • TONSILLECTOMY     • TOTAL ABDOMINAL HYSTERECTOMY      onset: 7/15/14     Family History   Problem Relation Age of Onset   • Alcohol abuse Mother    • Anemia Mother    • Diverticulitis Mother    • Alcohol abuse Father    • Colon cancer Father 43   • Pancreatic cancer Father    • Hypertension Father • Other Father 54        allen syndrome   • Breast cancer Maternal Grandmother    • Prostate cancer Maternal Grandfather    • Diabetes type II Family    • Cerebral aneurysm Family      Social History     Socioeconomic History   • Marital status: /Civil Union     Spouse name: None   • Number of children: None   • Years of education: None   • Highest education level: None   Occupational History   • None   Tobacco Use   • Smoking status: Never   • Smokeless tobacco: Never   • Tobacco comments:     no passive smoke exposure   Vaping Use   • Vaping Use: Never used   Substance and Sexual Activity   • Alcohol use: Yes     Comment: socially   • Drug use: Never   • Sexual activity: Not Currently   Other Topics Concern   • None   Social History Narrative   • None     Social Determinants of Health     Financial Resource Strain: Not on file   Food Insecurity: Not on file   Transportation Needs: Not on file   Physical Activity: Not on file   Stress: Not on file   Social Connections: Not on file   Intimate Partner Violence: Not on file   Housing Stability: Not on file     Current Outpatient Medications on File Prior to Visit   Medication Sig   • albuterol (PROVENTIL HFA,VENTOLIN HFA) 90 mcg/act inhaler USE 2 INHALATIONS EVERY 6 HOURS AS NEEDED FOR WHEEZING   • famotidine (PEPCID) 40 MG tablet TAKE 1 TABLET DAILY AT BEDTIME   • fexofenadine (ALLEGRA) 180 MG tablet Take 180 mg by mouth   • LORazepam (ATIVAN) 0 5 mg tablet Take 1 tablet (0 5 mg total) by mouth daily as needed for anxiety   • omeprazole (PriLOSEC) 40 MG capsule 2 (two) times a day   • [DISCONTINUED] montelukast (SINGULAIR) 10 mg tablet TAKE 1 TABLET EVERY EVENING   • [DISCONTINUED] sertraline (ZOLOFT) 50 mg tablet TAKE ONE AND ONE-HALF TABLET DAILY   • aspirin (ECOTRIN) 325 mg EC tablet Take 1 tablet (325 mg total) by mouth in the morning   (Patient not taking: Reported on 6/23/2022)   • estradiol (ESTRACE) 0 5 MG tablet Take 0 5 mg by mouth daily   • naproxen (NAPROSYN) 500 mg tablet TAKE 1 TABLET TWICE A DAY WITH MEALS   • oxyCODONE (ROXICODONE) 5 immediate release tablet Take 1 tablet (5 mg total) by mouth every 4 (four) hours as needed for moderate pain for up to 15 doses Max Daily Amount: 30 mg (Patient not taking: Reported on 6/23/2022)   • phentermine 37 5 MG capsule Take 1 capsule (37 5 mg total) by mouth every morning (Patient not taking: No sig reported)   • PREMARIN 0 625 MG tablet  (Patient not taking: No sig reported)   • topiramate (TOPAMAX) 25 mg tablet Take 1 tablet (25 mg total) by mouth 2 (two) times a day (Patient not taking: No sig reported)     Allergies   Allergen Reactions   • Fluticasone Other (See Comments)     Bloody nose   • Pollen Extract Other (See Comments)     Other reaction(s): Other (See Comments)  SNEEZING SINUS PROBLEMS     Immunization History   Administered Date(s) Administered   • COVID-19 MODERNA VACC 0 5 ML IM 01/27/2021, 02/24/2021, 10/28/2021   • COVID-19 Pfizer Vac BIVALENT Sridhar-sucrose 12 Yr+ IM (BOOSTER ONLY) 11/11/2022   • DT (pediatric) 1980, 1980, 1980, 12/09/1981, 06/04/2002   • H1N1, All Formulations 10/31/2009   • INFLUENZA 12/07/2006, 11/14/2009, 03/06/2014, 10/09/2017, 10/18/2021   • IPV 1980   • Influenza Split 11/15/2013   • Influenza, injectable, quadrivalent, preservative free 0 5 mL 10/14/2019, 10/16/2020   • MMR 11/04/1981, 08/06/1994   • OPV 1980, 1980   • Td (adult), adsorbed 10/10/2011       Objective     /84 (BP Location: Left arm, Patient Position: Sitting, Cuff Size: Large)   Pulse 96   Temp 97 6 °F (36 4 °C) (Tympanic)   Resp 16   Ht 5' 2 75" (1 594 m)   Wt 108 kg (237 lb)   SpO2 96%   BMI 42 32 kg/m²     Physical Exam  Vitals and nursing note reviewed  Constitutional:       Appearance: She is well-developed  HENT:      Head: Normocephalic and atraumatic  Eyes:      Pupils: Pupils are equal, round, and reactive to light     Cardiovascular: Rate and Rhythm: Normal rate and regular rhythm  Heart sounds: Normal heart sounds  Pulmonary:      Effort: Pulmonary effort is normal       Breath sounds: Normal breath sounds  Abdominal:      General: Bowel sounds are normal       Palpations: Abdomen is soft  Musculoskeletal:         General: Normal range of motion  Cervical back: Normal range of motion and neck supple  Lymphadenopathy:      Cervical: No cervical adenopathy  Skin:     General: Skin is warm  Neurological:      Mental Status: She is alert and oriented to person, place, and time  Cranial Nerves: No cranial nerve deficit  Elayne Shone, MD BMI Counseling: Body mass index is 42 32 kg/m²  The BMI is above normal  Nutrition recommendations include reducing portion sizes, decreasing overall calorie intake and 3-5 servings of fruits/vegetables daily  Exercise recommendations include moderate aerobic physical activity for 150 minutes/week

## 2022-12-21 NOTE — ASSESSMENT & PLAN NOTE
Not well controlled  Discussed about low-fat diet and regular exercise  We will continue to monitor fasting lipid profile  negative...

## 2022-12-26 DIAGNOSIS — M25.50 ARTHRALGIA, UNSPECIFIED JOINT: ICD-10-CM

## 2022-12-27 RX ORDER — NAPROXEN 500 MG/1
TABLET ORAL
Qty: 180 TABLET | Refills: 1 | Status: SHIPPED | OUTPATIENT
Start: 2022-12-27

## 2023-06-01 DIAGNOSIS — F41.9 ANXIETY: ICD-10-CM

## 2023-06-09 ENCOUNTER — RA CDI HCC (OUTPATIENT)
Dept: OTHER | Facility: HOSPITAL | Age: 43
End: 2023-06-09

## 2023-06-09 NOTE — PROGRESS NOTES
[facility]  coding opportunities          Chart Reviewed number of suggestions sent to Provider: 2   [phone]  [phone] a Please further classify    Patients Insurance        Commercial Insurance: [address] [place] [address]

## 2023-06-14 ENCOUNTER — APPOINTMENT (OUTPATIENT)
Dept: LAB | Facility: IMAGING CENTER | Age: 43
End: 2023-06-14
Payer: COMMERCIAL

## 2023-06-14 DIAGNOSIS — E78.49 OTHER HYPERLIPIDEMIA: ICD-10-CM

## 2023-06-14 DIAGNOSIS — R73.9 HYPERGLYCEMIA: ICD-10-CM

## 2023-06-14 LAB
ALBUMIN SERPL BCP-MCNC: 3.7 G/DL (ref 3.5–5)
ALP SERPL-CCNC: 66 U/L (ref 46–116)
ALT SERPL W P-5'-P-CCNC: 55 U/L (ref 12–78)
ANION GAP SERPL CALCULATED.3IONS-SCNC: 4 MMOL/L (ref 4–13)
AST SERPL W P-5'-P-CCNC: 32 U/L (ref 5–45)
BASOPHILS # BLD AUTO: 0.04 THOUSANDS/ÂΜL (ref 0–0.1)
BASOPHILS NFR BLD AUTO: 1 % (ref 0–1)
BILIRUB SERPL-MCNC: 0.36 MG/DL (ref 0.2–1)
BUN SERPL-MCNC: 10 MG/DL (ref 5–25)
CALCIUM SERPL-MCNC: 9.2 MG/DL (ref 8.3–10.1)
CHLORIDE SERPL-SCNC: 107 MMOL/L (ref 96–108)
CHOLEST SERPL-MCNC: 187 MG/DL
CO2 SERPL-SCNC: 28 MMOL/L (ref 21–32)
CREAT SERPL-MCNC: 0.74 MG/DL (ref 0.6–1.3)
EOSINOPHIL # BLD AUTO: 0.12 THOUSAND/ÂΜL (ref 0–0.61)
EOSINOPHIL NFR BLD AUTO: 2 % (ref 0–6)
ERYTHROCYTE [DISTWIDTH] IN BLOOD BY AUTOMATED COUNT: 12.9 % (ref 11.6–15.1)
GFR SERPL CREATININE-BSD FRML MDRD: 99 ML/MIN/1.73SQ M
GLUCOSE P FAST SERPL-MCNC: 106 MG/DL (ref 65–99)
HCT VFR BLD AUTO: 37.8 % (ref 34.8–46.1)
HDLC SERPL-MCNC: 46 MG/DL
HGB BLD-MCNC: 13 G/DL (ref 11.5–15.4)
IMM GRANULOCYTES # BLD AUTO: 0.03 THOUSAND/UL (ref 0–0.2)
IMM GRANULOCYTES NFR BLD AUTO: 0 % (ref 0–2)
LDLC SERPL CALC-MCNC: 117 MG/DL (ref 0–100)
LYMPHOCYTES # BLD AUTO: 2.32 THOUSANDS/ÂΜL (ref 0.6–4.47)
LYMPHOCYTES NFR BLD AUTO: 29 % (ref 14–44)
MCH RBC QN AUTO: 30.5 PG (ref 26.8–34.3)
MCHC RBC AUTO-ENTMCNC: 34.4 G/DL (ref 31.4–37.4)
MCV RBC AUTO: 89 FL (ref 82–98)
MONOCYTES # BLD AUTO: 0.57 THOUSAND/ÂΜL (ref 0.17–1.22)
MONOCYTES NFR BLD AUTO: 7 % (ref 4–12)
NEUTROPHILS # BLD AUTO: 5 THOUSANDS/ÂΜL (ref 1.85–7.62)
NEUTS SEG NFR BLD AUTO: 61 % (ref 43–75)
NRBC BLD AUTO-RTO: 0 /100 WBCS
PLATELET # BLD AUTO: 199 THOUSANDS/UL (ref 149–390)
PMV BLD AUTO: 10.5 FL (ref 8.9–12.7)
POTASSIUM SERPL-SCNC: 4 MMOL/L (ref 3.5–5.3)
PROT SERPL-MCNC: 8.3 G/DL (ref 6.4–8.4)
RBC # BLD AUTO: 4.26 MILLION/UL (ref 3.81–5.12)
SODIUM SERPL-SCNC: 139 MMOL/L (ref 135–147)
TRIGL SERPL-MCNC: 121 MG/DL
TSH SERPL DL<=0.05 MIU/L-ACNC: 2.24 UIU/ML (ref 0.45–4.5)
WBC # BLD AUTO: 8.08 THOUSAND/UL (ref 4.31–10.16)

## 2023-06-14 PROCEDURE — 80061 LIPID PANEL: CPT

## 2023-06-14 PROCEDURE — 85025 COMPLETE CBC W/AUTO DIFF WBC: CPT

## 2023-06-14 PROCEDURE — 80053 COMPREHEN METABOLIC PANEL: CPT

## 2023-06-14 PROCEDURE — 36415 COLL VENOUS BLD VENIPUNCTURE: CPT

## 2023-06-14 PROCEDURE — 83036 HEMOGLOBIN GLYCOSYLATED A1C: CPT

## 2023-06-14 PROCEDURE — 84443 ASSAY THYROID STIM HORMONE: CPT

## 2023-06-15 LAB
EST. AVERAGE GLUCOSE BLD GHB EST-MCNC: 103 MG/DL
HBA1C MFR BLD: 5.2 %

## 2023-06-16 ENCOUNTER — TELEPHONE (OUTPATIENT)
Dept: ADMINISTRATIVE | Facility: OTHER | Age: 43
End: 2023-06-16

## 2023-06-16 ENCOUNTER — OFFICE VISIT (OUTPATIENT)
Dept: FAMILY MEDICINE CLINIC | Facility: CLINIC | Age: 43
End: 2023-06-16
Payer: COMMERCIAL

## 2023-06-16 VITALS
HEART RATE: 93 BPM | OXYGEN SATURATION: 94 % | RESPIRATION RATE: 14 BRPM | TEMPERATURE: 98.4 F | DIASTOLIC BLOOD PRESSURE: 88 MMHG | WEIGHT: 230.6 LBS | SYSTOLIC BLOOD PRESSURE: 138 MMHG | HEIGHT: 63 IN | BODY MASS INDEX: 40.86 KG/M2

## 2023-06-16 DIAGNOSIS — G43.709 CHRONIC MIGRAINE WITHOUT AURA WITHOUT STATUS MIGRAINOSUS, NOT INTRACTABLE: ICD-10-CM

## 2023-06-16 DIAGNOSIS — R29.818 SUSPECTED SLEEP APNEA: Primary | ICD-10-CM

## 2023-06-16 DIAGNOSIS — Z12.31 ENCOUNTER FOR SCREENING MAMMOGRAM FOR MALIGNANT NEOPLASM OF BREAST: ICD-10-CM

## 2023-06-16 DIAGNOSIS — E66.01 CLASS 3 SEVERE OBESITY DUE TO EXCESS CALORIES WITH SERIOUS COMORBIDITY AND BODY MASS INDEX (BMI) OF 40.0 TO 44.9 IN ADULT (HCC): ICD-10-CM

## 2023-06-16 DIAGNOSIS — R73.9 HYPERGLYCEMIA: ICD-10-CM

## 2023-06-16 DIAGNOSIS — K21.9 GASTROESOPHAGEAL REFLUX DISEASE WITHOUT ESOPHAGITIS: ICD-10-CM

## 2023-06-16 DIAGNOSIS — E78.49 OTHER HYPERLIPIDEMIA: ICD-10-CM

## 2023-06-16 DIAGNOSIS — R03.0 ELEVATED BLOOD PRESSURE READING WITHOUT DIAGNOSIS OF HYPERTENSION: ICD-10-CM

## 2023-06-16 PROCEDURE — 99214 OFFICE O/P EST MOD 30 MIN: CPT | Performed by: FAMILY MEDICINE

## 2023-06-16 NOTE — TELEPHONE ENCOUNTER
Upon review of the In Basket request we noted HM is up to date with blue hyperlink 6/13/23    Any additional questions or concerns should be emailed to the Practice Liaisons via the appropriate education email address, please do not reply via In Basket      Thank you  Venkatesh Stauffer

## 2023-06-16 NOTE — ASSESSMENT & PLAN NOTE
Fasting glucose slightly elevated but A1c is normal   Continue to monitor fasting glucose and A1c  Discussed about low-carb diet and regular exercise  She was told [facility] will help

## 2023-06-16 NOTE — ASSESSMENT & PLAN NOTE
LDL slightly elevated but improved from before  Continue to follow low-fat diet and regular exercise  Continue to monitor fasting lipid profile

## 2023-06-16 NOTE — ASSESSMENT & PLAN NOTE
Slightly elevated  Discussed with patient to monitor blood pressure at home and come back in 4 to 6 weeks  I will consider starting her on medication if it continues to be elevated  Patient refused

## 2023-06-16 NOTE — TELEPHONE ENCOUNTER
----- Message from Mohsen Thomas sent at 6/16/2023  9:34 AM EDT -----  Regarding: mammogram  06/16/23 9:34 AM    Hello, our patient Aki Moreno has had mammogram  completed/performed  Please assist in updating the patient chart by [facility]  The date of service is [id number]       Thank you,  Mohsen Thomas  [facility] [address]

## 2023-06-16 NOTE — PROGRESS NOTES
Name: Alyssa Newsome      : 1980      MRN: [id number]  Encounter Provider: Clarice Vaca MD  Encounter Date: 2023   Encounter department: [address] [place] [address]  Suspected sleep apnea  -     Ambulatory Referral to Sleep Medicine; Future    2  Class 3 severe obesity due to excess calories with serious comorbidity and body mass index (BMI) of 40 0 to 44 9 in adult [facility])  Assessment & Plan: It was discussed about low-carb diet and regular exercise  She was given prescription for [facility]  Discussed with possible side effect  Come back in 6 weeks for follow-up  Orders:  -     Semaglutide-Weight Management (WEGOVY) 0 25 MG/0 5ML; Inject 0 5 mL (0 25 mg total) under the skin once a week for 28 days    3  Gastroesophageal reflux disease without esophagitis  Assessment & Plan:  Stable  Continue same  We will continue to monitor  4  Other hyperlipidemia  Assessment & Plan:  LDL slightly elevated but improved from before  Continue to follow low-fat diet and regular exercise  Continue to monitor fasting lipid profile  5  Chronic migraine without aura without status migrainosus, not intractable  Assessment & Plan:  Stable  We will continue to monitor  6  Hyperglycemia  Assessment & Plan:  Fasting glucose slightly elevated but A1c is normal   Continue to monitor fasting glucose and A1c  Discussed about low-carb diet and regular exercise  She was told [facility] will help  7  Elevated blood pressure reading without diagnosis of hypertension  Assessment & Plan:  Slightly elevated  Discussed with patient to monitor blood pressure at home and come back in 4 to 6 weeks  I will consider starting her on medication if it continues to be elevated  Subjective     She is here today for follow-up multiple medical problems  She has been taking her medication  Denies any side effects    Her blood pressure today was slightly elevated on the upper normal limit  Her blood work came back showing slightly elevated fasting glucose but her A1c is normal   Her LDL slightly evaded but improved from before  She has been feeling fatigued during the day and her  noticed her apneic episodes and snoring while she is sleeping  Review of Systems   Constitutional: Negative for chills and fever  HENT: Negative for trouble swallowing  Eyes: Negative for visual disturbance  Respiratory: Negative for cough and shortness of breath  Cardiovascular: Negative for chest pain, palpitations and leg swelling  Gastrointestinal: Negative for abdominal pain, constipation and diarrhea  Endocrine: Negative for cold intolerance and heat intolerance  Genitourinary: Negative for difficulty urinating and dysuria  Musculoskeletal: Negative for gait problem  Skin: Negative for rash  Neurological: Negative for dizziness, tremors, seizures and headaches  Hematological: Negative for adenopathy  Psychiatric/Behavioral: Negative for behavioral problems         Past Medical History:   Diagnosis Date   • Allergy    • Anxiety and depression    • Asthma    • Fibromyalgia, primary    • Atkinson syndrome    • Morbid obesity with BMI of 40 0-44 9, adult [facility])      Past Surgical History:   Procedure Laterality Date   • CARPAL TUNNEL RELEASE Bilateral    •  SECTION      2 C sections   • COLONOSCOPY     • EGD     • UT ARTHRS KNE SURG W/MENISCECTOMY MED/LAT W/SHVG Right 2022    Procedure: ARTHROSCOPIC MENISCECTOMY MEDIAL;  Surgeon: Ellen Herrera DO;  Location: [facility];  Service: Orthopedics   • REDUCTION MAMMAPLASTY Bilateral 2018   • TONSILLECTOMY     • TOTAL ABDOMINAL HYSTERECTOMY      onset: 7/15/14     Family History   Problem Relation Age of Onset   • Alcohol abuse Mother    • Anemia Mother    • Diverticulitis Mother    • Alcohol abuse Father    • Colon cancer Father 43   • Pancreatic cancer Father    • Hypertension Father    • Other Father 54 allen syndrome   • Breast cancer Maternal Grandmother    • Prostate cancer Maternal Grandfather    • Diabetes type II Family    • Cerebral aneurysm Family      Social History     Socioeconomic History   • Marital status: /Civil Union     Spouse name: None   • Number of children: None   • Years of education: None   • Highest education level: None   Occupational History   • None   Tobacco Use   • Smoking status: Never   • Smokeless tobacco: Never   • Tobacco comments:     no passive smoke exposure   Vaping Use   • Vaping Use: Never used   Substance and Sexual Activity   • Alcohol use: Yes     Comment: socially   • Drug use: Never   • Sexual activity: Not Currently   Other Topics Concern   • None   Social History Narrative   • None     Social Determinants of Health     Financial Resource Strain: Not on file   Food Insecurity: Not on file   Transportation Needs: Not on file   Physical Activity: Not on file   Stress: Not on file   Social Connections: Not on file   Intimate Partner Violence: Not on file   Housing Stability: Not on file     Current Outpatient Medications on File Prior to Visit   Medication Sig   • albuterol (PROVENTIL HFA,VENTOLIN HFA) 90 mcg/act inhaler USE 2 INHALATIONS EVERY 6 HOURS AS NEEDED FOR WHEEZING   • aspirin (ECOTRIN) 325 mg EC tablet Take 1 tablet (325 mg total) by mouth in the morning     • famotidine (PEPCID) 40 MG tablet TAKE 1 TABLET DAILY AT BEDTIME   • fexofenadine (ALLEGRA) 180 MG tablet Take 180 mg by mouth   • LORazepam (ATIVAN) 0 5 mg tablet Take 1 tablet (0 5 mg total) by mouth daily as needed for anxiety   • montelukast (SINGULAIR) 10 mg tablet Take 1 tablet (10 mg total) by mouth every evening   • naproxen (NAPROSYN) 500 mg tablet TAKE 1 TABLET TWICE A DAY WITH MEALS   • omeprazole (PriLOSEC) 40 MG capsule 2 (two) times a day   • sertraline (ZOLOFT) 50 mg tablet TAKE ONE AND ONE-HALF TABLETS DAILY   • estradiol (ESTRACE) 0 5 MG tablet Take 0 5 mg by mouth daily   • "PREMARIN 0 625 MG tablet    • [DISCONTINUED] oxyCODONE (ROXICODONE) 5 immediate release tablet Take 1 tablet (5 mg total) by mouth every 4 (four) hours as needed for moderate pain for up to 15 doses Max Daily Amount: 30 mg   • [DISCONTINUED] phentermine 37 5 MG capsule Take 1 capsule (37 5 mg total) by mouth every morning   • [DISCONTINUED] topiramate (TOPAMAX) 25 mg tablet Take 1 tablet (25 mg total) by mouth 2 (two) times a day     Allergies   Allergen Reactions   • Fluticasone Other (See Comments)     Bloody nose   • Pollen Extract Other (See Comments)     Other reaction(s): Other (See Comments)  SNEEZING SINUS PROBLEMS     Immunization History   Administered Date(s) Administered   • COVID-19 MODERNA VACC 0 5 ML IM 01/27/2021, 02/24/2021, 10/28/2021   • COVID-19 Pfizer Vac BIVALENT Sridhar-sucrose 12 Yr+ IM (BOOSTER ONLY) 11/11/2022   • DT (pediatric) 1980, 1980, 1980, 12/09/1981, 06/04/2002   • H1N1, All Formulations 10/31/2009   • INFLUENZA 12/07/2006, 11/14/2009, 03/06/2014, 10/09/2017, 10/18/2021   • IPV 1980   • Influenza Split 11/15/2013   • Influenza, injectable, quadrivalent, preservative free 0 5 mL 10/14/2019, 10/16/2020   • MMR 11/04/1981, 08/06/1994   • OPV 1980, 1980   • Td (adult), adsorbed 10/10/2011       Objective     /88 (BP Location: Left arm, Patient Position: Sitting, Cuff Size: Large)   Pulse 93   Temp 98 4 °F (36 9 °C) (Tympanic)   Resp 14   Ht 5' 2 75\" (1 594 m)   Wt 105 kg (230 lb 9 6 oz)   SpO2 94%   BMI 41 18 kg/m²     Physical Exam  Vitals and nursing note reviewed  Constitutional:       Appearance: She is well-developed  HENT:      Head: Normocephalic and atraumatic  Eyes:      Pupils: Pupils are equal, round, and reactive to light  Cardiovascular:      Rate and Rhythm: Normal rate and regular rhythm  Heart sounds: Normal heart sounds     Pulmonary:      Effort: Pulmonary effort is normal       Breath sounds: Normal breath " sounds  Abdominal:      General: Bowel sounds are normal       Palpations: Abdomen is soft  Musculoskeletal:         General: Normal range of motion  Cervical back: Normal range of motion and neck supple  Lymphadenopathy:      Cervical: No cervical adenopathy  Skin:     General: Skin is warm  Neurological:      Mental Status: She is alert and oriented to person, place, and time  Cranial Nerves: No cranial nerve deficit         Polly Rios MD

## 2023-06-19 DIAGNOSIS — J30.9 ALLERGIC RHINITIS, UNSPECIFIED SEASONALITY, UNSPECIFIED TRIGGER: ICD-10-CM

## 2023-06-19 RX ORDER — MONTELUKAST SODIUM 10 MG/1
TABLET ORAL
Qty: 90 TABLET | Refills: 3 | Status: SHIPPED | OUTPATIENT
Start: 2023-06-19

## 2023-06-26 DIAGNOSIS — M25.50 ARTHRALGIA, UNSPECIFIED JOINT: ICD-10-CM

## 2023-06-26 RX ORDER — NAPROXEN 500 MG/1
TABLET ORAL
Qty: 180 TABLET | Refills: 1 | Status: SHIPPED | OUTPATIENT
Start: 2023-06-26

## 2023-09-11 DIAGNOSIS — J45.20 MILD INTERMITTENT ASTHMA, UNSPECIFIED WHETHER COMPLICATED: ICD-10-CM

## 2023-09-11 RX ORDER — ALBUTEROL SULFATE 90 UG/1
AEROSOL, METERED RESPIRATORY (INHALATION)
Qty: 17 G | Refills: 4 | Status: SHIPPED | OUTPATIENT
Start: 2023-09-11

## 2023-10-23 ENCOUNTER — OFFICE VISIT (OUTPATIENT)
Dept: SLEEP CENTER | Facility: CLINIC | Age: 43
End: 2023-10-23
Payer: COMMERCIAL

## 2023-10-23 VITALS
BODY MASS INDEX: 42.33 KG/M2 | WEIGHT: 230 LBS | SYSTOLIC BLOOD PRESSURE: 138 MMHG | DIASTOLIC BLOOD PRESSURE: 88 MMHG | HEIGHT: 62 IN

## 2023-10-23 DIAGNOSIS — G47.19 EXCESSIVE DAYTIME SLEEPINESS: ICD-10-CM

## 2023-10-23 DIAGNOSIS — R29.818 SUSPECTED SLEEP APNEA: Primary | ICD-10-CM

## 2023-10-23 DIAGNOSIS — R51.9 MORNING HEADACHE: ICD-10-CM

## 2023-10-23 DIAGNOSIS — R06.83 SNORING: ICD-10-CM

## 2023-10-23 PROCEDURE — 99244 OFF/OP CNSLTJ NEW/EST MOD 40: CPT | Performed by: PSYCHIATRY & NEUROLOGY

## 2023-10-23 RX ORDER — SODIUM FLUORIDE 6 MG/ML
PASTE, DENTIFRICE DENTAL
COMMUNITY
Start: 2023-07-21

## 2023-10-23 NOTE — PATIENT INSTRUCTIONS
1.  Please schedule the sleep study as we discussed  2. After the test is read (typically within 1 week), I will order a PAP machine and/or supplies if indicated by the results. If the test is inconclusive, my office will be in touch to determine the next step (sooner follow-up, further testing etc)  3. I would then want to see you for a followup visit about 5-6 weeks after you receive your machine at home. Please bring your machine to the first visit  4. If you have any difficulties using the machine, please contact the medical supply for machine related issues (mask fit, leakage, question about settings) or contact me if you have side effects, concern about the air pressure, or symptoms of concern. It is very important to avoid driving while drowsy, this can be very dangerous or even cause serious injury or death. If sleepy, it is not safe to get behind the wheel. If you are driving and feels sleepy, it is very important to pull over right away. Even losing control of the car for a split second can be deadly. If you feel you cannot control when sleepiness occurs and cannot prevented, it is important to not drive at all until this improves. Please let me know if you experience this as it is very important. Nursing Support:  When: Monday through Friday 7A-5PM except holidays  Where: Our direct line is [phone]. If you are having a true emergency please call 911. In the event that the line is busy or it is after hours please leave a voice message and we will return your call. Please speak clearly, leaving your full name, birth date, best number to reach you and the reason for your call. Medication refills: We will need the name of the medication, the dosage, the ordering provider, whether you get a 30 or 90 day refill, and the pharmacy name and address. Medications will be ordered by the provider only. Nurses cannot call in prescriptions.   Please allow 7 days for medication refills. Physician requested updates: If your provider requested that you call with an update after starting medication, please be ready to provide us the medication and dosage, what time you take your medication, the time you attempt to fall asleep, time you fall asleep, when you wake up, and what time you get out of bed. Sleep Study Results: We will contact you with sleep study results and/or next steps after the physician has reviewed your testing.

## 2023-10-23 NOTE — PROGRESS NOTES
Assessment/Plan:    1. Suspected sleep apnea  -     Ambulatory Referral to Sleep Medicine    2. Snoring  -     Home Study; Future    3. Excessive daytime sleepiness  -     Home Study; Future    4. Morning headache  -     Home Study; Future      Meaghan Caceres  has symptoms that strongly suggest obstructive sleep apnea as she describes loud snoring, gasping in sleep, excessive sleepiness, morning headaches. I cannot fully explain why the symptoms worsened recently as her weight is steady and she had a total hysterectomy years ago, not recently. That said, based on her history, it is extremely likely she has SUNNY. To assess, I have ordered a home sleep test.  We discussed that of a home sleep test is inconclusive, she will need a follow-up diagnostic polysomnogram.  We discussed treatment for obstructive sleep apnea and she is open to CPAP. Therefore if SUNNY is confirmed, I will order CPAP for home use. She has some degree of allergies and problems with nasal breathing so potentially she may need a full facemask. In reviewing her chart, she has a borderline high serum carbon dioxide level, this could also indicate risk for obesity hypoventilation syndrome, if hypoxemia is identified on her test, she may need further work-up or an in lab CPAP titration study. We also discussed that she has morning headaches, these are most likely related to SUNNY. That said if her home test is negative or she does not have improvement with CPAP, she may need further work-up including a brain MRI. Subjective:      Patient ID: Fady Garcia is a 37 y.o. female. HPI    This is a 43-year-old female who presents for initial consultation with a chief complaint of abnormal breathing and sleep as well as sleepiness. She works daytime hours.   Past medical history is notable for anxiety, gastroesophageal reflux disease, asthma    Works as a , has to leave work at [employer] AM, home from work at 4 pm     She typically was bed around  pm, usually falls asleep without difficulty. She awakens 1-2 times a night and usually is able to fall back asleep easily. Some nights struggles to fall back asleep, more when stressed She awakens around 545 AM when working. On off days she goes to bed a little later and awakens around 530-6 AM. Sometimes falls back until 7 AM.  She usually sleeps about 7 hours a night. She is rarely refreshed upon awakening and usually feels sleepy during the day. She denies drowsy driving. Sleepiness is worse at 1 pm.   Naps on the weekends at home     She has loud snoring and gasping in sleep. She denies restless legs, sleepwalking or dream enactment. Wakes with headaches in temples, 4-5 days. Some light and sound sensitivity. No nausea. Takes OTC  meds which help. HA worse for  months    Has had loud snoring, witnessed gasping. Worse with nasal congestion. Worse when supine. Had a tonsillectomy 10 years ago  due to frequent strep throat infections     Caffeine- 12 oz of coffee a day  Alcohol- not often     Follett Sleepiness Scale  Sitting and reading: Moderate chance of dozing  Watching TV: Moderate chance of dozing  Sitting, inactive in a public place (e.g. a theatre or a meeting): Would never doze  As a passenger in a car for an hour without a break: Slight chance of dozing  Lying down to rest in the afternoon when circumstances permit: High chance of dozing  Sitting and talking to someone: Would never doze  Sitting quietly after a lunch without alcohol: Slight chance of dozing  In a car, while stopped for a few minutes in traffic: Would never doze  Total score: 9      Review of Systems   Constitutional:  Positive for fatigue. Negative for unexpected weight change. HENT:  Positive for congestion. Negative for nosebleeds and postnasal drip (when sick). Respiratory:  Negative for cough (when sick) and wheezing (only when sick).     Cardiovascular:  Negative for palpitations and leg swelling. Musculoskeletal:  Positive for arthralgias (kness), back pain and neck pain (occ). Allergic/Immunologic: Positive for environmental allergies (more end of [place] and Spring). Neurological:  Positive for numbness and headaches. Negative for dizziness. Psychiatric/Behavioral:  The patient is nervous/anxious. Objective:      Visit Vitals  /88 (BP Location: Left arm, Patient Position: Sitting, Cuff Size: Large)   Ht 5' 2" (1.575 m)   Wt 104 kg (230 lb)   BMI 42.07 kg/m²   OB Status Hysterectomy   Smoking Status Never   BSA 2.03 m²             Physical Exam  Constitutional:       Appearance: Normal appearance. HENT:      Head: Normocephalic and atraumatic. Mouth/Throat:      Mouth: Mucous membranes are moist.   Eyes:      Extraocular Movements: Extraocular movements intact. Pupils: Pupils are equal, round, and reactive to light. Cardiovascular:      Rate and Rhythm: Normal rate. Pulses: Normal pulses. Heart sounds: Normal heart sounds. Pulmonary:      Effort: Pulmonary effort is normal.      Breath sounds: Normal breath sounds. Musculoskeletal:      Right lower leg: No edema. Left lower leg: No edema. Neurological:      Mental Status: She is alert. Cranial Nerves: No cranial nerve deficit. Coordination: Coordination normal.      Gait: Gait normal.   Psychiatric:         Mood and Affect: Mood normal.         Behavior: Behavior normal.         Thought Content:  Thought content normal.         Judgment: Judgment normal.         Mallampati 4  Tonsil size : absent   Normal tongue size  High arched hard palate  Neck Circumference 16.75"

## 2023-11-14 ENCOUNTER — HOSPITAL ENCOUNTER (OUTPATIENT)
Dept: SLEEP CENTER | Facility: CLINIC | Age: 43
Discharge: HOME | End: 2023-11-14
Payer: COMMERCIAL

## 2023-11-14 DIAGNOSIS — G47.19 EXCESSIVE DAYTIME SLEEPINESS: ICD-10-CM

## 2023-11-14 DIAGNOSIS — R06.83 SNORING: ICD-10-CM

## 2023-11-14 DIAGNOSIS — R51.9 MORNING HEADACHE: ICD-10-CM

## 2023-11-14 PROCEDURE — G0399 HOME SLEEP TEST/TYPE 3 PORTA: HCPCS

## 2023-11-14 NOTE — PROGRESS NOTES
Home Sleep Study Documentation    HOME STUDY DEVICE: Noxturnal no                                           Kaylene G3 yes      Pre-Sleep Home Study:    Set-up and instructions performed by: Paris Hanson performed demonstration for Patient: yes    Return demonstration performed by Patient: yes    Written instructions provided to Patient: yes    Patient signed consent form: yes        Post-Sleep Home Study:    Additional comments by Patient: none     Home Sleep Study Failed:no:    Failure reason: N/A    Reported or Detected: N/A    Scored by: Debo Squires

## 2023-11-16 PROBLEM — G47.33 OSA (OBSTRUCTIVE SLEEP APNEA): Status: ACTIVE | Noted: 2023-11-16

## 2023-11-18 DIAGNOSIS — G47.33 OSA (OBSTRUCTIVE SLEEP APNEA): Primary | ICD-10-CM

## 2023-11-22 ENCOUNTER — OFFICE VISIT (OUTPATIENT)
Dept: FAMILY MEDICINE CLINIC | Facility: CLINIC | Age: 43
End: 2023-11-22
Payer: COMMERCIAL

## 2023-11-22 VITALS
RESPIRATION RATE: 16 BRPM | DIASTOLIC BLOOD PRESSURE: 86 MMHG | HEIGHT: 62 IN | WEIGHT: 232.2 LBS | OXYGEN SATURATION: 97 % | SYSTOLIC BLOOD PRESSURE: 138 MMHG | TEMPERATURE: 97.8 F | HEART RATE: 73 BPM | BODY MASS INDEX: 42.73 KG/M2

## 2023-11-22 DIAGNOSIS — F41.9 ANXIETY AND DEPRESSION: ICD-10-CM

## 2023-11-22 DIAGNOSIS — K21.9 GASTROESOPHAGEAL REFLUX DISEASE WITHOUT ESOPHAGITIS: ICD-10-CM

## 2023-11-22 DIAGNOSIS — E66.01 CLASS 3 SEVERE OBESITY DUE TO EXCESS CALORIES WITH SERIOUS COMORBIDITY AND BODY MASS INDEX (BMI) OF 40.0 TO 44.9 IN ADULT (HCC): Primary | ICD-10-CM

## 2023-11-22 DIAGNOSIS — R03.0 ELEVATED BLOOD PRESSURE READING WITHOUT DIAGNOSIS OF HYPERTENSION: ICD-10-CM

## 2023-11-22 DIAGNOSIS — R11.0 NAUSEA: ICD-10-CM

## 2023-11-22 DIAGNOSIS — E66.01 MORBID OBESITY WITH BMI OF 40.0-44.9, ADULT (HCC): ICD-10-CM

## 2023-11-22 DIAGNOSIS — J45.20 MILD INTERMITTENT ASTHMA WITHOUT COMPLICATION: ICD-10-CM

## 2023-11-22 DIAGNOSIS — E78.49 OTHER HYPERLIPIDEMIA: ICD-10-CM

## 2023-11-22 DIAGNOSIS — F32.A ANXIETY AND DEPRESSION: ICD-10-CM

## 2023-11-22 PROCEDURE — 99214 OFFICE O/P EST MOD 30 MIN: CPT | Performed by: FAMILY MEDICINE

## 2023-11-22 RX ORDER — ONDANSETRON 4 MG/1
4 TABLET, FILM COATED ORAL EVERY 8 HOURS PRN
Qty: 20 TABLET | Refills: 0 | Status: SHIPPED | OUTPATIENT
Start: 2023-11-22

## 2023-11-22 NOTE — ASSESSMENT & PLAN NOTE
Slightly elevated LDL. Discussed about low-fat diet and regular exercise. Continue to monitor fasting lipid profile.

## 2023-11-22 NOTE — ASSESSMENT & PLAN NOTE
It was discussed about low-carb diet and regular exercise. She was given prescription for Mounjaro 0.5 mg weekly. Discussed with possible side effects.

## 2023-11-22 NOTE — PROGRESS NOTES
Name: Tanika Bush      : 1980      MRN: [id number]  Encounter Provider: Donnell Shannon MD  Encounter Date: 2023   Encounter department: [facility]     1. Class 3 severe obesity due to excess calories with serious comorbidity and body mass index (BMI) of 40.0 to 44.9 in adult [facility])  Assessment & Plan: It was discussed about low-carb diet and regular exercise. She was given prescription for Mounjaro 0.5 mg weekly. Discussed with possible side effects. Orders:  -     tirzepatide 2.5 MG/0.5ML; Inject 0.5 mL (2.5 mg total) under the skin every 7 days    2. Elevated blood pressure reading without diagnosis of hypertension    3. Other hyperlipidemia  Assessment & Plan:  Slightly elevated LDL. Discussed about low-fat diet and regular exercise. Continue to monitor fasting lipid profile. Orders:  -     CBC and differential; Future  -     Comprehensive metabolic panel; Future  -     Lipid Panel with Direct LDL reflex; Future  -     TSH, 3rd generation with Free T4 reflex; Future    4. Nausea  -     ondansetron (ZOFRAN) 4 mg tablet; Take 1 tablet (4 mg total) by mouth every 8 (eight) hours as needed for nausea or vomiting    5. Gastroesophageal reflux disease without esophagitis  Assessment & Plan:  Stable on famotidine and omeprazole. Continue same. We will continue to monitor. 6. Mild intermittent asthma without complication  Assessment & Plan:  Stable. Continue same. We will continue to monitor. 7. Anxiety and depression  Assessment & Plan:  Stable on sertraline. Continue same. Will continue to monitor. 8. Morbid obesity with BMI of 40.0-44.9, adult ([address] [facility])           Subjective      TK is a 38 yo female with a PMH of GERD, class 3 obesity, presenting to the office today for a 6 mo follow up visit. Patient reports being prescribed Wegovy but was unable to get any and so she was told to come back in.  Patient reports that her blood pressure was also a concern of hers, as she was told that [facility] would help decrease her blood pressure. Patient reports being on a water pill before but was taken off. Patient reports no other concerns today. Patient has been taking medications as prescribed without any complaints. Anxiety  Patient reports no chest pain, dizziness, nausea, palpitations or shortness of breath. Depression  Pertinent negatives include no abdominal pain, chest pain, chills, congestion, coughing, diaphoresis, fever, headaches, nausea, numbness, vomiting or weakness. Hypertension  Associated symptoms include anxiety. Pertinent negatives include no chest pain, headaches, palpitations or shortness of breath. Review of Systems   Constitutional:  Negative for chills, diaphoresis and fever. HENT:  Negative for congestion, rhinorrhea and trouble swallowing. Respiratory:  Negative for cough and shortness of breath. Cardiovascular:  Negative for chest pain, palpitations and leg swelling. Gastrointestinal:  Negative for abdominal pain, constipation, diarrhea, nausea and vomiting. Endocrine: Negative for polydipsia and polyuria. Genitourinary:  Negative for difficulty urinating, dysuria and hematuria. Neurological:  Negative for dizziness, weakness, light-headedness, numbness and headaches. Psychiatric/Behavioral:  Positive for depression.         Current Outpatient Medications on File Prior to Visit   Medication Sig   • albuterol (PROVENTIL HFA,VENTOLIN HFA) 90 mcg/act inhaler USE 2 INHALATIONS EVERY 6 HOURS AS NEEDED FOR WHEEZING   • estradiol (ESTRACE) 0.5 MG tablet Take 0.5 mg by mouth daily   • famotidine (PEPCID) 40 MG tablet TAKE 1 TABLET DAILY AT BEDTIME   • fexofenadine (ALLEGRA) 180 MG tablet Take 180 mg by mouth   • LORazepam (ATIVAN) 0.5 mg tablet Take 1 tablet (0.5 mg total) by mouth daily as needed for anxiety   • montelukast (SINGULAIR) 10 mg tablet TAKE 1 TABLET EVERY EVENING   • naproxen (NAPROSYN) 500 mg tablet TAKE 1 TABLET TWICE A DAY WITH MEALS   • omeprazole (PriLOSEC) 40 MG capsule 2 (two) times a day   • sertraline (ZOLOFT) 50 mg tablet TAKE ONE AND ONE-HALF TABLETS DAILY   • Sodium Fluoride 5000 PPM 1.1 % PSTE BRUSH TWICE A DAY . AFTER BRISHING,EXPECTORATE, BUT DO NOT RINSE   • aspirin (ECOTRIN) 325 mg EC tablet Take 1 tablet (325 mg total) by mouth in the morning. (Patient not taking: Reported on 10/23/2023)   • [DISCONTINUED] PREMARIN 0.625 MG tablet  (Patient not taking: Reported on 10/23/2023)       Objective     /86 (BP Location: Left arm, Patient Position: Sitting, Cuff Size: Large)   Pulse 73   Temp 97.8 °F (36.6 °C) (Tympanic)   Resp 16   Ht 5' 2" (1.575 m)   Wt 105 kg (232 lb 3.2 oz)   SpO2 97%   BMI 42.47 kg/m²     Physical Exam  Constitutional:       Appearance: Normal appearance. HENT:      Head: Normocephalic and atraumatic. Right Ear: Tympanic membrane normal.      Left Ear: Tympanic membrane normal.      Mouth/Throat:      Mouth: Mucous membranes are moist.   Eyes:      Pupils: Pupils are equal, round, and reactive to light. Cardiovascular:      Rate and Rhythm: Normal rate and regular rhythm. Pulses: Normal pulses. Heart sounds: Normal heart sounds. Pulmonary:      Effort: Pulmonary effort is normal.      Breath sounds: Normal breath sounds. Abdominal:      General: Bowel sounds are normal.      Palpations: Abdomen is soft. Skin:     General: Skin is warm. Capillary Refill: Capillary refill takes less than 2 seconds. Neurological:      Mental Status: She is alert and oriented to person, place, and time. Psychiatric:         Mood and Affect: Mood normal.         Behavior: Behavior normal.       Aries Mendoza MD  BMI Counseling: Body mass index is 42.47 kg/m². The BMI is above normal. Nutrition recommendations include reducing portion sizes, decreasing overall calorie intake, and 3-5 servings of fruits/vegetables daily.  Exercise recommendations include moderate aerobic physical activity for 150 minutes/week.

## 2023-11-24 ENCOUNTER — TELEPHONE (OUTPATIENT)
Dept: SLEEP CENTER | Facility: CLINIC | Age: 43
End: 2023-11-24

## 2023-11-24 LAB
DME PARACHUTE DELIVERY DATE REQUESTED: NORMAL
DME PARACHUTE DELIVERY NOTE: NORMAL
DME PARACHUTE ITEM DESCRIPTION: NORMAL
DME PARACHUTE ORDER STATUS: NORMAL
DME PARACHUTE SUPPLIER NAME: NORMAL
DME PARACHUTE SUPPLIER PHONE: NORMAL

## 2023-11-24 NOTE — TELEPHONE ENCOUNTER
Home sleep study is resulted and shows moderate SUNNY, worse while supine, with event related desaturations. Prominent snoring was also noted. Call placed to patient. Advised of above. DME set up 12/8/2023 in [facility]. Compliance follow up 1/11/2024 with Dr. Pamela Diop. Rx for CPAP and clinicals sent to [facility] via [employer].

## 2023-11-24 NOTE — TELEPHONE ENCOUNTER
----- Message from Ashwini Chacon MD sent at 11/18/2023  1:49 PM EST -----  Test shows SUNNY, CPAP ordered , message sent

## 2023-11-27 ENCOUNTER — TELEPHONE (OUTPATIENT)
Dept: FAMILY MEDICINE CLINIC | Facility: CLINIC | Age: 43
End: 2023-11-27

## 2023-11-28 ENCOUNTER — TELEPHONE (OUTPATIENT)
Dept: FAMILY MEDICINE CLINIC | Facility: CLINIC | Age: 43
End: 2023-11-28

## 2023-11-28 NOTE — TELEPHONE ENCOUNTER
----- Message from Laura Matson sent at 11/28/2023  9:55 AM EST -----  Regarding: [facility]  Contact: [phone]  I just wanted to let you know I tried four different pharmacies and everyone told me there are supply issues with [facility] as well. I was not able to get the prerscription filled.    Laura

## 2023-11-28 NOTE — TELEPHONE ENCOUNTER
Pt was seen 11/22/23 and is unable to get the mounjaro after contacting several pharmacies.    Please advise

## 2023-12-08 LAB
DME PARACHUTE DELIVERY DATE ACTUAL: NORMAL
DME PARACHUTE DELIVERY DATE EXPECTED: NORMAL
DME PARACHUTE DELIVERY DATE REQUESTED: NORMAL
DME PARACHUTE DELIVERY NOTE: NORMAL
DME PARACHUTE ITEM DESCRIPTION: NORMAL
DME PARACHUTE ORDER STATUS: NORMAL
DME PARACHUTE SUPPLIER NAME: NORMAL
DME PARACHUTE SUPPLIER PHONE: NORMAL

## 2023-12-17 DIAGNOSIS — E66.01 CLASS 3 SEVERE OBESITY DUE TO EXCESS CALORIES WITH SERIOUS COMORBIDITY AND BODY MASS INDEX (BMI) OF 40.0 TO 44.9 IN ADULT (HCC): ICD-10-CM

## 2023-12-18 ENCOUNTER — RA CDI HCC (OUTPATIENT)
Dept: OTHER | Facility: HOSPITAL | Age: 43
End: 2023-12-18

## 2023-12-18 NOTE — PROGRESS NOTES
HCC coding opportunities          Chart Reviewed number of suggestions sent to Provider: 1   F32.a  Please further classify as per ICD 10 coding guidelines   Patients Insurance        Commercial Insurance: [employer] Commercial [employer]

## 2023-12-21 RX ORDER — TIRZEPATIDE 2.5 MG/.5ML
INJECTION, SOLUTION SUBCUTANEOUS
Refills: 0 | OUTPATIENT
Start: 2023-12-21

## 2023-12-21 NOTE — TELEPHONE ENCOUNTER
[facility] sent message stating they do not carry Mounjaro and pt should try another pharmacy.     LM for pt to call office back   I also sent [employer] message to pt     Medication order removed form this encounter    regular

## 2023-12-26 ENCOUNTER — OFFICE VISIT (OUTPATIENT)
Dept: FAMILY MEDICINE CLINIC | Facility: CLINIC | Age: 43
End: 2023-12-26
Payer: COMMERCIAL

## 2023-12-26 VITALS
DIASTOLIC BLOOD PRESSURE: 70 MMHG | HEIGHT: 62 IN | OXYGEN SATURATION: 97 % | RESPIRATION RATE: 16 BRPM | SYSTOLIC BLOOD PRESSURE: 124 MMHG | TEMPERATURE: 97.7 F | HEART RATE: 72 BPM | BODY MASS INDEX: 42.88 KG/M2 | WEIGHT: 233 LBS

## 2023-12-26 DIAGNOSIS — E66.01 CLASS 3 SEVERE OBESITY DUE TO EXCESS CALORIES WITH SERIOUS COMORBIDITY AND BODY MASS INDEX (BMI) OF 40.0 TO 44.9 IN ADULT (HCC): Primary | ICD-10-CM

## 2023-12-26 DIAGNOSIS — E78.49 OTHER HYPERLIPIDEMIA: ICD-10-CM

## 2023-12-26 DIAGNOSIS — K21.9 GASTROESOPHAGEAL REFLUX DISEASE WITHOUT ESOPHAGITIS: ICD-10-CM

## 2023-12-26 DIAGNOSIS — G47.33 OSA (OBSTRUCTIVE SLEEP APNEA): ICD-10-CM

## 2023-12-26 DIAGNOSIS — Z00.01 ABNORMAL WELLNESS EXAM: ICD-10-CM

## 2023-12-26 DIAGNOSIS — R73.9 HYPERGLYCEMIA: ICD-10-CM

## 2023-12-26 PROCEDURE — 99396 PREV VISIT EST AGE 40-64: CPT | Performed by: FAMILY MEDICINE

## 2023-12-26 PROCEDURE — 99214 OFFICE O/P EST MOD 30 MIN: CPT | Performed by: FAMILY MEDICINE

## 2023-12-26 RX ORDER — TIRZEPATIDE 2.5 MG/.5ML
2.5 INJECTION, SOLUTION SUBCUTANEOUS WEEKLY
Qty: 2 ML | Refills: 0 | Status: SHIPPED | OUTPATIENT
Start: 2023-12-26 | End: 2024-01-23

## 2023-12-26 RX ORDER — FAMOTIDINE 40 MG/1
40 TABLET, FILM COATED ORAL
Qty: 90 TABLET | Refills: 1 | Status: SHIPPED | OUTPATIENT
Start: 2023-12-26

## 2023-12-26 NOTE — PROGRESS NOTES
Name: Laura Matson      : 1980      MRN: [id number]  Encounter Provider: Yanelis Craven MD  Encounter Date: 2023   Encounter department:  [facility]  PRIMARY CARE    Assessment & Plan     1. Class 3 severe obesity due to excess calories with serious comorbidity and body mass index (BMI) of 40.0 to 44.9 in adult (HCC)  Assessment & Plan:  Not improving.  Discussed about low-carb diet and regular exercise.  She was given prescription for Zepbound 2.5 mg weekly.  This was discussed about possible side effects.  Come back in 1 month for follow-up.    Orders:  -     tirzepatide (Zepbound) 2.5 mg/0.5 mL auto-injector; Inject 0.5 mL (2.5 mg total) under the skin once a week for 28 days    2. Gastroesophageal reflux disease without esophagitis  Assessment & Plan:  Stable on omeprazole and famotidine.  Continue same.  Will continue to monitor.    Orders:  -     famotidine (PEPCID) 40 MG tablet; Take 1 tablet (40 mg total) by mouth daily at bedtime    3. Hyperglycemia  Assessment & Plan:  Elevated fasting glucose but A1c was normal.  Will continue to follow-up low-carb diet and regular exercise.  She was told Mounjaro will help.    Repeat fasting glucose and A1c.    Orders:  -     Hemoglobin A1C; Future    4. Other hyperlipidemia  Assessment & Plan:  Not well-controlled.  Discussed about low-fat diet and regular exercise.  Continue to monitor fasting lipid profile.    Orders:  -     CBC and differential; Future  -     Comprehensive metabolic panel; Future  -     Lipid Panel with Direct LDL reflex; Future  -     TSH, 3rd generation with Free T4 reflex; Future    5. Abnormal wellness exam  Assessment & Plan:  It was discussed about immunizations, diet, exercise and safety measures.      6. SUNNY (obstructive sleep apnea)  Assessment & Plan:  Continue to follow-up with sleep medicine.             Subjective     She is here today for follow-up multiple medical problems and for wellness exam.  She  has been taking her medications.  She denies any side effects.  She can follow-up with her gynecologist.  She is due for blood work.  Her last blood work showed slightly elevated cholesterol and fasting glucose.  Her A1c was normal.  She has been trying to lose weight has not been able to get the medicine from the pharmacy yet.      Review of Systems   Constitutional:  Negative for chills and fever.   HENT:  Negative for trouble swallowing.    Eyes:  Negative for visual disturbance.   Respiratory:  Negative for cough and shortness of breath.    Cardiovascular:  Negative for chest pain, palpitations and leg swelling.   Gastrointestinal:  Negative for abdominal pain, constipation and diarrhea.   Endocrine: Negative for cold intolerance and heat intolerance.   Genitourinary:  Negative for difficulty urinating and dysuria.   Musculoskeletal:  Negative for gait problem.   Skin:  Negative for rash.   Neurological:  Negative for dizziness, tremors, seizures and headaches.   Hematological:  Negative for adenopathy.   Psychiatric/Behavioral:  Negative for behavioral problems.        Past Medical History:   Diagnosis Date   • Allergy    • Anxiety and depression    • Asthma    • Fibromyalgia, primary    • Atkinson syndrome    • Morbid obesity with BMI of 40.0-44.9, adult (HCC)      Past Surgical History:   Procedure Laterality Date   • CARPAL TUNNEL RELEASE Bilateral    •  SECTION      2 C sections   • COLONOSCOPY     • EGD     • OH ARTHRS KNE SURG W/MENISCECTOMY MED/LAT W/SHVG Right 2022    Procedure: ARTHROSCOPIC MENISCECTOMY MEDIAL;  Surgeon: Oscar Hughes DO;  Location: [facility];  Service: Orthopedics   • REDUCTION MAMMAPLASTY Bilateral 2018   • TONSILLECTOMY     • TOTAL ABDOMINAL HYSTERECTOMY      onset: 7/15/14     Family History   Problem Relation Age of Onset   • Alcohol abuse Mother    • Anemia Mother    • Diverticulitis Mother    • Alcohol abuse Father    • Colon cancer Father 42   • Pancreatic  cancer Father    • Hypertension Father    • Other Father 55        allen syndrome   • Breast cancer Maternal Grandmother    • Prostate cancer Maternal Grandfather    • Diabetes type II Family    • Cerebral aneurysm Family      Social History     Socioeconomic History   • Marital status: /Civil Union     Spouse name: None   • Number of children: None   • Years of education: None   • Highest education level: None   Occupational History   • None   Tobacco Use   • Smoking status: Never   • Smokeless tobacco: Never   • Tobacco comments:     no passive smoke exposure   Vaping Use   • Vaping status: Never Used   Substance and Sexual Activity   • Alcohol use: Yes     Comment: socially   • Drug use: Never   • Sexual activity: Not Currently   Other Topics Concern   • None   Social History Narrative   • None     Social Determinants of Health     Financial Resource Strain: Not on file   Food Insecurity: Not on file   Transportation Needs: Not on file   Physical Activity: Not on file   Stress: Not on file   Social Connections: Not on file   Intimate Partner Violence: Not on file   Housing Stability: Not on file     Current Outpatient Medications on File Prior to Visit   Medication Sig   • albuterol (PROVENTIL HFA,VENTOLIN HFA) 90 mcg/act inhaler USE 2 INHALATIONS EVERY 6 HOURS AS NEEDED FOR WHEEZING   • estradiol (ESTRACE) 0.5 MG tablet Take 0.5 mg by mouth daily   • fexofenadine (ALLEGRA) 180 MG tablet Take 180 mg by mouth   • LORazepam (ATIVAN) 0.5 mg tablet Take 1 tablet (0.5 mg total) by mouth daily as needed for anxiety   • montelukast (SINGULAIR) 10 mg tablet TAKE 1 TABLET EVERY EVENING   • naproxen (NAPROSYN) 500 mg tablet TAKE 1 TABLET TWICE A DAY WITH MEALS   • omeprazole (PriLOSEC) 40 MG capsule 2 (two) times a day   • sertraline (ZOLOFT) 50 mg tablet TAKE ONE AND ONE-HALF TABLETS DAILY   • Sodium Fluoride 5000 PPM 1.1 % PSTE BRUSH TWICE A DAY .AFTER BRISHING,EXPECTORATE, BUT DO NOT RINSE   • [DISCONTINUED]  "famotidine (PEPCID) 40 MG tablet TAKE 1 TABLET DAILY AT BEDTIME   • [DISCONTINUED] aspirin (ECOTRIN) 325 mg EC tablet Take 1 tablet (325 mg total) by mouth in the morning. (Patient not taking: Reported on 10/23/2023)   • [DISCONTINUED] ondansetron (ZOFRAN) 4 mg tablet Take 1 tablet (4 mg total) by mouth every 8 (eight) hours as needed for nausea or vomiting (Patient not taking: Reported on 12/26/2023)   • [DISCONTINUED] tirzepatide 2.5 MG/0.5ML Inject 0.5 mL (2.5 mg total) under the skin every 7 days     Allergies   Allergen Reactions   • Fluticasone Other (See Comments)     Bloody nose   • Pollen Extract Other (See Comments)     Other reaction(s): Other (See Comments)  SNEEZING SINUS PROBLEMS     Immunization History   Administered Date(s) Administered   • COVID-19 MODERNA VACC 0.5 ML IM 01/27/2021, 02/24/2021, 10/28/2021   • COVID-19 Pfizer Vac BIVALENT Sridhar-sucrose 12 Yr+ IM 11/11/2022   • DT (pediatric) 1980, 1980, 1980, 12/09/1981, 06/04/2002   • H1N1, All Formulations 10/31/2009   • INFLUENZA 12/07/2006, 11/14/2009, 03/06/2014, 10/09/2017, 10/18/2021, 10/09/2023   • IPV 1980   • Influenza Split 11/15/2013   • Influenza, injectable, quadrivalent, preservative free 0.5 mL 10/14/2019, 10/16/2020   • MMR 11/04/1981, 08/06/1994   • OPV 1980, 1980   • Td (adult), adsorbed 10/10/2011       Objective     /70 (BP Location: Left arm, Patient Position: Sitting, Cuff Size: Large)   Pulse 72   Temp 97.7 °F (36.5 °C) (Tympanic)   Resp 16   Ht 5' 2\" (1.575 m)   Wt 106 kg (233 lb)   SpO2 97%   BMI 42.62 kg/m²     Physical Exam  Vitals and nursing note reviewed.   Constitutional:       Appearance: She is well-developed.   HENT:      Head: Normocephalic and atraumatic.   Eyes:      Pupils: Pupils are equal, round, and reactive to light.   Cardiovascular:      Rate and Rhythm: Normal rate and regular rhythm.      Heart sounds: Normal heart sounds.   Pulmonary:      Effort: " Pulmonary effort is normal.      Breath sounds: Normal breath sounds.   Abdominal:      General: Bowel sounds are normal.      Palpations: Abdomen is soft.   Musculoskeletal:      Cervical back: Normal range of motion and neck supple.   Lymphadenopathy:      Cervical: No cervical adenopathy.   Skin:     General: Skin is warm.   Neurological:      Mental Status: She is alert and oriented to person, place, and time.       Yanelis Craven MD

## 2023-12-26 NOTE — ASSESSMENT & PLAN NOTE
Elevated fasting glucose but A1c was normal.  Will continue to follow-up low-carb diet and regular exercise.  She was told Mounjaro will help.    Repeat fasting glucose and A1c.

## 2023-12-26 NOTE — ASSESSMENT & PLAN NOTE
Not improving.  Discussed about low-carb diet and regular exercise.  She was given prescription for Zepbound 2.5 mg weekly.  This was discussed about possible side effects.  Come back in 1 month for follow-up.

## 2023-12-29 ENCOUNTER — TELEPHONE (OUTPATIENT)
Dept: FAMILY MEDICINE CLINIC | Facility: CLINIC | Age: 43
End: 2023-12-29

## 2024-01-11 ENCOUNTER — OFFICE VISIT (OUTPATIENT)
Dept: SLEEP CENTER | Facility: CLINIC | Age: 44
End: 2024-01-11
Payer: COMMERCIAL

## 2024-01-11 VITALS
HEIGHT: 62 IN | DIASTOLIC BLOOD PRESSURE: 78 MMHG | BODY MASS INDEX: 42.69 KG/M2 | SYSTOLIC BLOOD PRESSURE: 121 MMHG | WEIGHT: 232 LBS

## 2024-01-11 DIAGNOSIS — G47.33 OSA (OBSTRUCTIVE SLEEP APNEA): Primary | ICD-10-CM

## 2024-01-11 PROCEDURE — 99213 OFFICE O/P EST LOW 20 MIN: CPT | Performed by: PSYCHIATRY & NEUROLOGY

## 2024-01-11 NOTE — PROGRESS NOTES
Assessment/Plan:    1. SUNNY (obstructive sleep apnea)  -     PAP DME Pressure Change    Laura is doing great with CPAP use, her AHI is normal and treatment is beneficial and effective.  In analyzing her data, she is approaching the maximum pressure set by her machine and therefore I suggested that we try to increase the maximum pressure.  I change his settings to 5-17 cm H2O.  We discussed that this is not comfortable she should contact me.  Potentially this may be helpful to lessen further the need to nap or doze in the evening after work.    We discussed that with significant weight loss sleep apnea can improve or resolve.  Of note, she may soon start a medication for this, we will therefore track this over time and could consider a repeat sleep study with weight loss in the future.      Subjective:      Patient ID: Laura Matson is a 43 y.o. female.    HPI    This is a 43-year-old female who presents for a follow-up visit, she has a history of moderate obstructive sleep apnea, respiratory event index 22.8 diagnosed on a home sleep test in November 2023.  Events were much worse in the supine position on that test.  Of 142 respiratory events she had 91 obstructive apneas and 51 hypopneas.  Event related desaturations were observed.  Auto-CPAP was ordered and this is her first follow-up since that time.    Interval history-  She has been using CPAP on a daily basis and finds it beneficial.  She recently saw her family medicine physician, was noted that her weight was not significantly improving, Zepbound was prescribed.  Headaches are much better , now only when barometric pressure changes     No longer snoring with CPAP, she snores still if she falls asleep without CPAP.    On a work night, in bed  pm. Awake 545 am, may wake briefly, does not bother her  She is refreshed when she wakes.  Sometimes gets sleepy in the day.  May nap on a weekend as a luxury.  May doze at home.      Denies possible CPAP side  "effects such as dry mouth, nasal congestion, nose bleeds, aerophagia, or nasal dryness.      CPAP data reviewed today  AirSense 11 set at 5 to 15 cm H2O  Average session-7 hours 22 minutes  Uses 33 out of 33 days  AHI 2.1  95% pressure-14.8 cm H2O  No significant mask leakage.    [place] Sleepiness Scale  Sitting and reading: Slight chance of dozing  Watching TV: Slight chance of dozing  Sitting, inactive in a public place (e.g. a theatre or a meeting): Would never doze  As a passenger in a car for an hour without a break: Slight chance of dozing  Lying down to rest in the afternoon when circumstances permit: High chance of dozing  Sitting and talking to someone: Would never doze  Sitting quietly after a lunch without alcohol: Slight chance of dozing  In a car, while stopped for a few minutes in traffic: Would never doze  Total score: 7      Review of Systems    As above    Objective:      /78 (BP Location: Left arm, Patient Position: Sitting, Cuff Size: Large)   Ht 5' 2\" (1.575 m)   Wt 105 kg (232 lb)   BMI 42.43 kg/m²          Physical Exam    RRR  Lungs CTA b/l  "

## 2024-01-11 NOTE — PATIENT INSTRUCTIONS
If you do not like the air pressure change on your machine, please contact me through the patient portal.  Sometimes it may take a few days to get used to the new air pressure.     You are doing great with use of your machine.  .  On your prior diagnostic sleep study you had 23 times per hour of sleep apnea events.  With your machine, you have 2 times per hour of sleep apnea episodes.     The goal is to use CPAP all night long, more usage= more health benefit and more symptomatic improvement.  If any barriers or problems arise that lead to difficulty using your machine, please let me know either through a [employer] message or alternatively, by calling my office.        Nursing Support:  When: Monday through Friday 7A-5PM except holidays  Where: Our direct line is [phone].    If you are having a true emergency please call 911.  In the event that the line is busy or it is after hours please leave a voice message and we will return your call.  Please speak clearly, leaving your full name, birth date, best number to reach you and the reason for your call.   Medication refills: We will need the name of the medication, the dosage, the ordering provider, whether you get a 30 or 90 day refill, and the pharmacy name and address.  Medications will be ordered by the provider only.  Nurses cannot call in prescriptions.  Please allow 7 days for medication refills.  Physician requested updates: If your provider requested that you call with an update after starting medication, please be ready to provide us the medication and dosage, what time you take your medication, the time you attempt to fall asleep, time you fall asleep, when you wake up, and what time you get out of bed.  Sleep Study Results: We will contact you with sleep study results and/or next steps after the physician has reviewed your testing.

## 2024-01-12 ENCOUNTER — TELEPHONE (OUTPATIENT)
Dept: SLEEP CENTER | Facility: CLINIC | Age: 44
End: 2024-01-12

## 2024-01-12 LAB
DME PARACHUTE DELIVERY DATE REQUESTED: NORMAL
DME PARACHUTE ITEM DESCRIPTION: NORMAL
DME PARACHUTE ORDER STATUS: NORMAL
DME PARACHUTE SUPPLIER NAME: NORMAL
DME PARACHUTE SUPPLIER PHONE: NORMAL

## 2024-01-15 LAB
DME PARACHUTE DELIVERY DATE ACTUAL: NORMAL
DME PARACHUTE DELIVERY DATE REQUESTED: NORMAL
DME PARACHUTE ITEM DESCRIPTION: NORMAL
DME PARACHUTE ORDER STATUS: NORMAL
DME PARACHUTE SUPPLIER NAME: NORMAL
DME PARACHUTE SUPPLIER PHONE: NORMAL

## 2024-01-26 ENCOUNTER — RA CDI HCC (OUTPATIENT)
Dept: OTHER | Facility: HOSPITAL | Age: 44
End: 2024-01-26

## 2024-01-26 NOTE — PROGRESS NOTES
HCC coding opportunities          Chart Reviewed number of suggestions sent to Provider: 1   F32.a  Please further classify as per ICD 10 coding guidelines     This is a reminder to address (resolve/update/assess) ALL HCC (risk adjustment) codes as found on active problem list for 2024 as patient scores reset to zero GAEL.  Also, just a reminder to please review and assess all other chronic conditions for 2024  Patients Insurance        Commercial Insurance: [employer] Commercial [employer]

## 2024-01-30 DIAGNOSIS — E66.01 CLASS 3 SEVERE OBESITY DUE TO EXCESS CALORIES WITH SERIOUS COMORBIDITY AND BODY MASS INDEX (BMI) OF 40.0 TO 44.9 IN ADULT (HCC): Primary | ICD-10-CM

## 2024-01-30 RX ORDER — TIRZEPATIDE 5 MG/.5ML
5 INJECTION, SOLUTION SUBCUTANEOUS WEEKLY
Qty: 2 ML | Refills: 0 | Status: SHIPPED | OUTPATIENT
Start: 2024-01-30 | End: 2024-02-27

## 2024-01-31 ENCOUNTER — TELEPHONE (OUTPATIENT)
Age: 44
End: 2024-01-31

## 2024-01-31 NOTE — TELEPHONE ENCOUNTER
PA for tirzepatide (Zepbound) 5 mg/0.5 mL auto-injector     Submitted via  [][employer]-KEY   [x][employer]-Case ID # [id number]  []Faxed to plan   []Other website   []Phone call Case ID #     Office notes sent, clinical questions answered. Awaiting determination

## 2024-02-02 ENCOUNTER — OFFICE VISIT (OUTPATIENT)
Dept: FAMILY MEDICINE CLINIC | Facility: CLINIC | Age: 44
End: 2024-02-02
Payer: COMMERCIAL

## 2024-02-02 VITALS
DIASTOLIC BLOOD PRESSURE: 66 MMHG | HEART RATE: 64 BPM | SYSTOLIC BLOOD PRESSURE: 120 MMHG | WEIGHT: 235 LBS | RESPIRATION RATE: 16 BRPM | TEMPERATURE: 97.8 F | OXYGEN SATURATION: 94 % | BODY MASS INDEX: 43.24 KG/M2 | HEIGHT: 62 IN

## 2024-02-02 DIAGNOSIS — E66.01 CLASS 3 SEVERE OBESITY DUE TO EXCESS CALORIES WITH SERIOUS COMORBIDITY AND BODY MASS INDEX (BMI) OF 40.0 TO 44.9 IN ADULT (HCC): Primary | ICD-10-CM

## 2024-02-02 DIAGNOSIS — K21.9 GASTROESOPHAGEAL REFLUX DISEASE WITHOUT ESOPHAGITIS: ICD-10-CM

## 2024-02-02 PROCEDURE — 99213 OFFICE O/P EST LOW 20 MIN: CPT | Performed by: FAMILY MEDICINE

## 2024-02-02 NOTE — PROGRESS NOTES
Name: Laura Matson      : 1980      MRN: [id number]  Encounter Provider: Yanelis Craven MD  Encounter Date: 2024   Encounter department: [facility] [address] PRIMARY CARE    Assessment & Plan     1. Class 3 severe obesity due to excess calories with serious comorbidity and body mass index (BMI) of 40.0 to 44.9 in adult ([facility])  Assessment & Plan:  Improving on Mounjaro 2.5 mg weekly.  Discussed about low-carb diet and regular exercise and I am going to increase her Mounjaro to 5 mg weekly.  Discussed all possible side effects.  Come back in 1 month.      2. Gastroesophageal reflux disease without esophagitis  Assessment & Plan:  Stable on omeprazole.  Continue same.  We will continue to monitor.           Subjective     She is here today for follow-up for weight management.  She was started on Mounjaro 2.5 mg weekly.  She was not able to get the medicine right away and her weight went up a little bit before she started on the medicine.  She stated on her home scale her weight is down 7 pounds since she started on the Mounjaro 2.5 mg weekly.  She denies any significant side effects.      Review of Systems   Constitutional:  Negative for chills and fever.   HENT:  Negative for trouble swallowing.    Eyes:  Negative for visual disturbance.   Respiratory:  Negative for cough and shortness of breath.    Cardiovascular:  Negative for chest pain, palpitations and leg swelling.   Gastrointestinal:  Negative for abdominal pain, constipation and diarrhea.   Endocrine: Negative for cold intolerance and heat intolerance.   Genitourinary:  Negative for difficulty urinating and dysuria.   Musculoskeletal:  Negative for gait problem.   Skin:  Negative for rash.   Neurological:  Negative for dizziness, tremors, seizures and headaches.   Hematological:  Negative for adenopathy.   Psychiatric/Behavioral:  Negative for behavioral problems.        Past Medical History:   Diagnosis Date   • Allergy    • Anxiety  and depression    • Asthma    • Fibromyalgia, primary    • Atkinson syndrome    • Morbid obesity with BMI of 40.0-44.9, adult (HCC)      Past Surgical History:   Procedure Laterality Date   • CARPAL TUNNEL RELEASE Bilateral    •  SECTION      2 C sections   • COLONOSCOPY     • EGD     • [place] ARTHRS KNE SURG W/MENISCECTOMY MED/LAT W/SHVG Right 2022    Procedure: ARTHROSCOPIC MENISCECTOMY MEDIAL;  Surgeon: Oscar Hughes DO;  Location: [facility] OR;  Service: Orthopedics   • REDUCTION MAMMAPLASTY Bilateral 2018   • TONSILLECTOMY     • TOTAL ABDOMINAL HYSTERECTOMY      onset: 7/15/14     Family History   Problem Relation Age of Onset   • Alcohol abuse Mother    • Anemia Mother    • Diverticulitis Mother    • Alcohol abuse Father    • Colon cancer Father 42   • Pancreatic cancer Father    • Hypertension Father    • Other Father 55        atkinson syndrome   • Breast cancer Maternal Grandmother    • Prostate cancer Maternal Grandfather    • Diabetes type II Family    • Cerebral aneurysm Family      Social History     Socioeconomic History   • Marital status: /Civil Union     Spouse name: None   • Number of children: None   • Years of education: None   • Highest education level: None   Occupational History   • None   Tobacco Use   • Smoking status: Never   • Smokeless tobacco: Never   • Tobacco comments:     no passive smoke exposure   Vaping Use   • Vaping status: Never Used   Substance and Sexual Activity   • Alcohol use: Yes     Comment: socially   • Drug use: Never   • Sexual activity: Not Currently   Other Topics Concern   • None   Social History Narrative   • None     Social Determinants of Health     Financial Resource Strain: Not on file   Food Insecurity: Not on file   Transportation Needs: Not on file   Physical Activity: Not on file   Stress: Not on file   Social Connections: Not on file   Intimate Partner Violence: Not on file   Housing Stability: Not on file     Current Outpatient Medications on  File Prior to Visit   Medication Sig   • albuterol (PROVENTIL HFA,VENTOLIN HFA) 90 mcg/act inhaler USE 2 INHALATIONS EVERY 6 HOURS AS NEEDED FOR WHEEZING   • famotidine (PEPCID) 40 MG tablet Take 1 tablet (40 mg total) by mouth daily at bedtime   • fexofenadine (ALLEGRA) 180 MG tablet Take 180 mg by mouth   • LORazepam (ATIVAN) 0.5 mg tablet Take 1 tablet (0.5 mg total) by mouth daily as needed for anxiety   • montelukast (SINGULAIR) 10 mg tablet TAKE 1 TABLET EVERY EVENING   • naproxen (NAPROSYN) 500 mg tablet TAKE 1 TABLET TWICE A DAY WITH MEALS   • omeprazole (PriLOSEC) 40 MG capsule 2 (two) times a day   • sertraline (ZOLOFT) 50 mg tablet TAKE ONE AND ONE-HALF TABLETS DAILY   • tirzepatide (Zepbound) 5 mg/0.5 mL auto-injector Inject 0.5 mL (5 mg total) under the skin once a week for 28 days   • estradiol (ESTRACE) 0.5 MG tablet Take 0.5 mg by mouth daily   • Sodium Fluoride 5000 PPM 1.1 % PSTE BRUSH TWICE A DAY .AFTER BRISHING,EXPECTORATE, BUT DO NOT RINSE     Allergies   Allergen Reactions   • Fluticasone Other (See Comments)     Bloody nose   • Pollen Extract Other (See Comments)     Other reaction(s): Other (See Comments)  SNEEZING SINUS PROBLEMS     Immunization History   Administered Date(s) Administered   • COVID-19 MODERNA VACC 0.5 ML IM 01/27/2021, 02/24/2021, 10/28/2021   • COVID-19 Pfizer Vac BIVALENT Sridhar-sucrose 12 Yr+ IM 11/11/2022   • DT (pediatric) 1980, 1980, 1980, 12/09/1981, 06/04/2002   • H1N1, All Formulations 10/31/2009   • INFLUENZA 12/07/2006, 11/14/2009, 03/06/2014, 10/09/2017, 10/18/2021, 09/26/2022, 10/09/2023   • IPV 1980   • Influenza Split 11/15/2013   • Influenza, injectable, quadrivalent, preservative free 0.5 mL 10/14/2019, 10/16/2020   • MMR 11/04/1981, 08/06/1994   • OPV 1980, 1980   • Td (adult), adsorbed 10/10/2011       Objective     /66 (BP Location: Left arm, Patient Position: Sitting, Cuff Size: Large)   Pulse 64   Temp 97.8 °F  "(36.6 °C) (Tympanic)   Resp 16   Ht 5' 2\" (1.575 m)   Wt 107 kg (235 lb)   SpO2 94%   BMI 42.98 kg/m²     Physical Exam  Vitals and nursing note reviewed.   Constitutional:       Appearance: She is well-developed.   HENT:      Head: Normocephalic and atraumatic.   Eyes:      Pupils: Pupils are equal, round, and reactive to light.   Cardiovascular:      Rate and Rhythm: Normal rate and regular rhythm.      Heart sounds: Normal heart sounds.   Pulmonary:      Effort: Pulmonary effort is normal.      Breath sounds: Normal breath sounds.   Abdominal:      General: Bowel sounds are normal.      Palpations: Abdomen is soft.   Musculoskeletal:      Cervical back: Normal range of motion and neck supple.   Lymphadenopathy:      Cervical: No cervical adenopathy.   Skin:     General: Skin is warm.   Neurological:      Mental Status: She is alert and oriented to person, place, and time.       Yanelis Craven MD    "

## 2024-02-02 NOTE — ASSESSMENT & PLAN NOTE
Improving on Mounjaro 2.5 mg weekly.  Discussed about low-carb diet and regular exercise and I am going to increase her Mounjaro to 5 mg weekly.  Discussed all possible side effects.  Come back in 1 month.

## 2024-02-26 DIAGNOSIS — E66.01 CLASS 3 SEVERE OBESITY DUE TO EXCESS CALORIES WITH SERIOUS COMORBIDITY AND BODY MASS INDEX (BMI) OF 40.0 TO 44.9 IN ADULT (HCC): Primary | ICD-10-CM

## 2024-02-26 RX ORDER — TIRZEPATIDE 7.5 MG/.5ML
7.5 INJECTION, SOLUTION SUBCUTANEOUS WEEKLY
Qty: 2 ML | Refills: 0 | Status: SHIPPED | OUTPATIENT
Start: 2024-02-26 | End: 2024-02-28 | Stop reason: SDUPTHER

## 2024-02-27 NOTE — PROGRESS NOTES
Reason for call:   [x] Refill   [] Prior Auth  [x] Other: This is not a duplicate.  The medication was sent to the wrong pharmacy.  Please resend to [facility] in [place].     Office:   [x] PCP/Provider -   [] Specialty/Provider -     Medication: tirzepatide (Zepbound) 7.5 mg/0.5 mL auto-injector     Dose/Frequency: 7.5 mg, Subcutaneous, Weekly    Quantity: 2mL    Pharmacy: [facility] [address] [place] [address] [phone]

## 2024-02-28 DIAGNOSIS — E66.01 CLASS 3 SEVERE OBESITY DUE TO EXCESS CALORIES WITH SERIOUS COMORBIDITY AND BODY MASS INDEX (BMI) OF 40.0 TO 44.9 IN ADULT (HCC): ICD-10-CM

## 2024-02-28 NOTE — TELEPHONE ENCOUNTER
Reason for call:   [x] Refill   [] Prior Auth  [] Other: not a duplicate    Office:   [x] PCP/Provider -   [] Specialty/Provider -     Medication: tirzepatide (Zepbound) 7.5 mg/0.5 mL auto-injector     Dose/Frequency:   Inject 0.5 mL (7.5 mg total) under the skin once a week        Quantity: 2 mL    Pharmacy: [id number] [address] [phone]     Does the patient have enough for 3 days?   [] Yes   [] No - Send as HP to POD

## 2024-02-29 RX ORDER — TIRZEPATIDE 7.5 MG/.5ML
7.5 INJECTION, SOLUTION SUBCUTANEOUS WEEKLY
Qty: 2 ML | Refills: 0 | Status: SHIPPED | OUTPATIENT
Start: 2024-02-29

## 2024-03-02 RX ORDER — TIRZEPATIDE 7.5 MG/.5ML
7.5 INJECTION, SOLUTION SUBCUTANEOUS WEEKLY
Qty: 2 ML | Refills: 0 | Status: SHIPPED | OUTPATIENT
Start: 2024-03-02

## 2024-03-06 ENCOUNTER — OFFICE VISIT (OUTPATIENT)
Dept: FAMILY MEDICINE CLINIC | Facility: CLINIC | Age: 44
End: 2024-03-06
Payer: COMMERCIAL

## 2024-03-06 VITALS
DIASTOLIC BLOOD PRESSURE: 80 MMHG | HEART RATE: 68 BPM | TEMPERATURE: 98.2 F | HEIGHT: 62 IN | RESPIRATION RATE: 16 BRPM | WEIGHT: 228 LBS | BODY MASS INDEX: 41.96 KG/M2 | SYSTOLIC BLOOD PRESSURE: 130 MMHG | OXYGEN SATURATION: 97 %

## 2024-03-06 DIAGNOSIS — K21.9 GASTROESOPHAGEAL REFLUX DISEASE WITHOUT ESOPHAGITIS: Primary | ICD-10-CM

## 2024-03-06 DIAGNOSIS — E66.01 CLASS 3 SEVERE OBESITY DUE TO EXCESS CALORIES WITH SERIOUS COMORBIDITY AND BODY MASS INDEX (BMI) OF 40.0 TO 44.9 IN ADULT (HCC): ICD-10-CM

## 2024-03-06 PROCEDURE — 99213 OFFICE O/P EST LOW 20 MIN: CPT | Performed by: FAMILY MEDICINE

## 2024-03-06 RX ORDER — TIRZEPATIDE 7.5 MG/.5ML
7.5 INJECTION, SOLUTION SUBCUTANEOUS WEEKLY
Qty: 2 ML | Refills: 3 | Status: SHIPPED | OUTPATIENT
Start: 2024-03-06

## 2024-03-06 NOTE — PROGRESS NOTES
Name: Laura Matson      : 1980      MRN: [id number]  Encounter Provider: Yanelis Craven MD  Encounter Date: 3/6/2024   Encounter department: [facility]    Assessment & Plan     1. Gastroesophageal reflux disease without esophagitis  Assessment & Plan:  Stable on omeprazole and famotidine.  Continue same.  We will continue to monitor.      2. Class 3 severe obesity due to excess calories with serious comorbidity and body mass index (BMI) of 40.0 to 44.9 in adult (HCC)  Assessment & Plan:  Improving.  Continue on Zepbound 7.5 mg weekly.  It was discussed with patient about possible side effect.  Discussed about low-carb diet medical exercise.  Come back in 2 months for follow-up.    Orders:  -     tirzepatide (Zepbound) 7.5 mg/0.5 mL auto-injector; Inject 0.5 mL (7.5 mg total) under the skin once a week           Subjective     She is here today for follow-up for weight management.  She has been using her Zepbound 7.5 mg weekly.  She stated she has been having some problems with nauseous feeling otherwise she is doing well.  She lost 7 pounds since her last visit.  She denies any other complaint.      Review of Systems   Constitutional:  Negative for chills and fever.   HENT:  Negative for trouble swallowing.    Eyes:  Negative for visual disturbance.   Respiratory:  Negative for cough and shortness of breath.    Cardiovascular:  Negative for chest pain, palpitations and leg swelling.   Gastrointestinal:  Negative for abdominal pain, constipation and diarrhea.   Endocrine: Negative for cold intolerance and heat intolerance.   Genitourinary:  Negative for difficulty urinating and dysuria.   Musculoskeletal:  Negative for gait problem.   Skin:  Negative for rash.   Neurological:  Negative for dizziness, tremors, seizures and headaches.   Hematological:  Negative for adenopathy.   Psychiatric/Behavioral:  Negative for behavioral problems.        Past Medical History:    Diagnosis Date   • Allergy    • Anxiety and depression    • Asthma    • Fibromyalgia, primary    • Atkinson syndrome    • Morbid obesity with BMI of 40.0-44.9, adult (HCC)      Past Surgical History:   Procedure Laterality Date   • CARPAL TUNNEL RELEASE Bilateral    •  SECTION      2 C sections   • COLONOSCOPY     • EGD     • ME ARTHRS KNE SURG W/MENISCECTOMY MED/LAT W/SHVG Right 2022    Procedure: ARTHROSCOPIC MENISCECTOMY MEDIAL;  Surgeon: Oscar Hughes DO;  Location:  MAIN OR;  Service: Orthopedics   • REDUCTION MAMMAPLASTY Bilateral 2018   • TONSILLECTOMY     • TOTAL ABDOMINAL HYSTERECTOMY      onset: 7/15/14     Family History   Problem Relation Age of Onset   • Alcohol abuse Mother    • Anemia Mother    • Diverticulitis Mother    • Alcohol abuse Father    • Colon cancer Father 42   • Pancreatic cancer Father    • Hypertension Father    • Other Father 55        atkinson syndrome   • Breast cancer Maternal Grandmother    • Prostate cancer Maternal Grandfather    • Diabetes type II Family    • Cerebral aneurysm Family      Social History     Socioeconomic History   • Marital status: /Civil Union     Spouse name: None   • Number of children: None   • Years of education: None   • Highest education level: None   Occupational History   • None   Tobacco Use   • Smoking status: Never   • Smokeless tobacco: Never   • Tobacco comments:     no passive smoke exposure   Vaping Use   • Vaping status: Never Used   Substance and Sexual Activity   • Alcohol use: Yes     Comment: socially   • Drug use: Never   • Sexual activity: Not Currently   Other Topics Concern   • None   Social History Narrative   • None     Social Determinants of Health     Financial Resource Strain: Not on file   Food Insecurity: Not on file   Transportation Needs: Not on file   Physical Activity: Not on file   Stress: Not on file   Social Connections: Not on file   Intimate Partner Violence: Not on file   Housing Stability: Not on  file     Current Outpatient Medications on File Prior to Visit   Medication Sig   • albuterol (PROVENTIL HFA,VENTOLIN HFA) 90 mcg/act inhaler USE 2 INHALATIONS EVERY 6 HOURS AS NEEDED FOR WHEEZING   • estradiol (ESTRACE) 0.5 MG tablet Take 0.5 mg by mouth daily   • famotidine (PEPCID) 40 MG tablet Take 1 tablet (40 mg total) by mouth daily at bedtime   • fexofenadine (ALLEGRA) 180 MG tablet Take 180 mg by mouth   • LORazepam (ATIVAN) 0.5 mg tablet Take 1 tablet (0.5 mg total) by mouth daily as needed for anxiety   • montelukast (SINGULAIR) 10 mg tablet TAKE 1 TABLET EVERY EVENING   • naproxen (NAPROSYN) 500 mg tablet TAKE 1 TABLET TWICE A DAY WITH MEALS   • omeprazole (PriLOSEC) 40 MG capsule 2 (two) times a day   • sertraline (ZOLOFT) 50 mg tablet TAKE ONE AND ONE-HALF TABLETS DAILY   • Sodium Fluoride 5000 PPM 1.1 % PSTE BRUSH TWICE A DAY .AFTER BRISHING,EXPECTORATE, BUT DO NOT RINSE   • [DISCONTINUED] tirzepatide (Zepbound) 7.5 mg/0.5 mL auto-injector Inject 0.5 mL (7.5 mg total) under the skin once a week   • [DISCONTINUED] tirzepatide (Zepbound) 7.5 mg/0.5 mL auto-injector Inject 0.5 mL (7.5 mg total) under the skin once a week (Patient not taking: Reported on 3/6/2024)     Allergies   Allergen Reactions   • Fluticasone Other (See Comments)     Bloody nose   • Pollen Extract Other (See Comments)     Other reaction(s): Other (See Comments)  SNEEZING SINUS PROBLEMS     Immunization History   Administered Date(s) Administered   • COVID-19 MODERNA VACC 0.5 ML IM 01/27/2021, 02/24/2021, 10/28/2021   • COVID-19 Pfizer Vac BIVALENT Sridhar-sucrose 12 Yr+ IM 11/11/2022   • DT (pediatric) 1980, 1980, 1980, 12/09/1981, 06/04/2002   • H1N1, All Formulations 10/31/2009   • INFLUENZA 12/07/2006, 11/14/2009, 03/06/2014, 10/09/2017, 10/18/2021, 09/26/2022, 10/09/2023   • IPV 1980   • Influenza Split 11/15/2013   • Influenza, injectable, quadrivalent, preservative free 0.5 mL 10/14/2019, 10/16/2020   • MMR  "11/04/1981, 08/06/1994   • OPV 1980, 1980   • Td (adult), adsorbed 10/10/2011       Objective     /80 (BP Location: Left arm, Patient Position: Sitting, Cuff Size: Large)   Pulse 68   Temp 98.2 °F (36.8 °C) (Tympanic)   Resp 16   Ht 5' 2\" (1.575 m)   Wt 103 kg (228 lb)   SpO2 97%   BMI 41.70 kg/m²     Physical Exam  Vitals and nursing note reviewed.   Constitutional:       Appearance: She is well-developed.   HENT:      Head: Normocephalic and atraumatic.   Eyes:      Pupils: Pupils are equal, round, and reactive to light.   Cardiovascular:      Rate and Rhythm: Normal rate and regular rhythm.      Heart sounds: Normal heart sounds.   Pulmonary:      Effort: Pulmonary effort is normal.      Breath sounds: Normal breath sounds.   Abdominal:      General: Bowel sounds are normal.      Palpations: Abdomen is soft.   Musculoskeletal:      Cervical back: Normal range of motion and neck supple.   Lymphadenopathy:      Cervical: No cervical adenopathy.   Skin:     General: Skin is warm.   Neurological:      Mental Status: She is alert and oriented to person, place, and time.       Yanelis Craven MD    "

## 2024-03-06 NOTE — ASSESSMENT & PLAN NOTE
Improving.  Continue on Zepbound 7.5 mg weekly.  It was discussed with patient about possible side effect.  Discussed about low-carb diet medical exercise.  Come back in 2 months for follow-up.

## 2024-04-04 ENCOUNTER — TELEPHONE (OUTPATIENT)
Dept: OTHER | Facility: HOSPITAL | Age: 44
End: 2024-04-04

## 2024-04-04 NOTE — TELEPHONE ENCOUNTER
Pt called and stated pharmacy told her she needs PA. Pt was advised PA was already approved and good until 9/27/2024. Pt stated she called insurance and she was unable to get a clear answer on what's going on. Please review. Thank you.

## 2024-04-15 ENCOUNTER — TELEPHONE (OUTPATIENT)
Age: 44
End: 2024-04-15

## 2024-04-15 NOTE — TELEPHONE ENCOUNTER
Patient states she needs another PA due to the pharmacy not filling it. Please advise. Insurance states they will not cover it due to the dose/amount.

## 2024-04-29 ENCOUNTER — RA CDI HCC (OUTPATIENT)
Dept: OTHER | Facility: HOSPITAL | Age: 44
End: 2024-04-29

## 2024-04-29 PROBLEM — Z11.52 ENCOUNTER FOR SCREENING FOR COVID-19: Status: RESOLVED | Noted: 2021-01-15 | Resolved: 2024-04-29

## 2024-04-29 PROBLEM — Z23 ENCOUNTER FOR IMMUNIZATION: Status: RESOLVED | Noted: 2019-04-16 | Resolved: 2024-04-29

## 2024-04-29 NOTE — PROGRESS NOTES
HCC coding opportunities          Chart Reviewed number of suggestions sent to Provider: 2   J45.909  Depression- Please further classify as per ICD 10 coding guidelines     Patients Insurance        Commercial Insurance: [employer]

## 2024-05-06 ENCOUNTER — OFFICE VISIT (OUTPATIENT)
Dept: FAMILY MEDICINE CLINIC | Facility: CLINIC | Age: 44
End: 2024-05-06
Payer: COMMERCIAL

## 2024-05-06 VITALS
SYSTOLIC BLOOD PRESSURE: 124 MMHG | DIASTOLIC BLOOD PRESSURE: 80 MMHG | RESPIRATION RATE: 16 BRPM | BODY MASS INDEX: 42.51 KG/M2 | WEIGHT: 231 LBS | TEMPERATURE: 98.4 F | OXYGEN SATURATION: 98 % | HEART RATE: 66 BPM | HEIGHT: 62 IN

## 2024-05-06 DIAGNOSIS — R73.9 HYPERGLYCEMIA: ICD-10-CM

## 2024-05-06 DIAGNOSIS — K21.9 GASTROESOPHAGEAL REFLUX DISEASE WITHOUT ESOPHAGITIS: ICD-10-CM

## 2024-05-06 DIAGNOSIS — E78.49 OTHER HYPERLIPIDEMIA: ICD-10-CM

## 2024-05-06 DIAGNOSIS — G43.709 CHRONIC MIGRAINE WITHOUT AURA WITHOUT STATUS MIGRAINOSUS, NOT INTRACTABLE: ICD-10-CM

## 2024-05-06 DIAGNOSIS — E66.01 CLASS 3 SEVERE OBESITY DUE TO EXCESS CALORIES WITH SERIOUS COMORBIDITY AND BODY MASS INDEX (BMI) OF 40.0 TO 44.9 IN ADULT (HCC): Primary | ICD-10-CM

## 2024-05-06 PROCEDURE — 99214 OFFICE O/P EST MOD 30 MIN: CPT | Performed by: FAMILY MEDICINE

## 2024-05-06 NOTE — PROGRESS NOTES
Name: Laura Matson      : 1980      MRN: [id number]  Encounter Provider: Yanelis Craven MD  Encounter Date: 2024   [facility]    Assessment & Plan     1. Class 3 severe obesity due to excess calories with serious comorbidity and body mass index (BMI) of 40.0 to 44.9 in adult (HCC)  Assessment & Plan:  It was discussed about low-carb diet and regular exercise.  I am going to switch her to Wegovy 0.25 mg weekly.  It was discussed with possible side effects.  Come back in 1 month.    Orders:  -     Semaglutide-Weight Management (WEGOVY) 0.25 MG/0.5ML; Inject 0.5 mL (0.25 mg total) under the skin once a week    2. Hyperglycemia  Assessment & Plan:  Elevated fasting glucose but A1c was normal.  Will continue to follow-up low-carb diet and regular exercise.  She was told Wegovy will help.    Repeat fasting glucose and A1c.    Orders:  -     Hemoglobin A1C; Future    3. Other hyperlipidemia  Assessment & Plan:  Not well-controlled.  Discussed about low-fat diet and regular exercise.  Continue to monitor fasting lipid profile.    Orders:  -     CBC and differential; Future  -     Comprehensive metabolic panel; Future  -     Lipid Panel with Direct LDL reflex; Future  -     TSH, 3rd generation with Free T4 reflex; Future    4. Chronic migraine without aura without status migrainosus, not intractable  Assessment & Plan:  Stable.  We will continue to monitor.      5. Gastroesophageal reflux disease without esophagitis  Assessment & Plan:  Stable on omeprazole and famotidine.  Continue same.  We will continue to monitor.             Subjective     She is here today for follow-up multiple medical problems and follow-up for weight management.  She was not able to get her tirzepatide due to insurance coverage.  She stated she was doing very well on the medication but since she is out of the medication she started gaining weight back.  She is due for blood  work.    Anxiety  Patient reports no chest pain, dizziness, palpitations or shortness of breath.       Depression  Pertinent negatives include no abdominal pain, chest pain, chills, coughing, fever, headaches or rash.     Review of Systems   Constitutional:  Negative for chills and fever.   HENT:  Negative for trouble swallowing.    Eyes:  Negative for visual disturbance.   Respiratory:  Negative for cough and shortness of breath.    Cardiovascular:  Negative for chest pain, palpitations and leg swelling.   Gastrointestinal:  Negative for abdominal pain, constipation and diarrhea.   Endocrine: Negative for cold intolerance and heat intolerance.   Genitourinary:  Negative for difficulty urinating and dysuria.   Musculoskeletal:  Negative for gait problem.   Skin:  Negative for rash.   Neurological:  Negative for dizziness, tremors, seizures and headaches.   Hematological:  Negative for adenopathy.   Psychiatric/Behavioral:  Positive for depression. Negative for behavioral problems.        Past Medical History:   Diagnosis Date   • Allergy    • Anxiety and depression    • Asthma    • Encounter for immunization 2019    Last Assessment & Plan:   WWE  diet/exercise reviewed  Calcium/vit D reviewed     Last Assessment & Plan:   Will confirm with surgeon timeline for next mammogram   • Encounter for screening for COVID-19 01/15/2021   • Fibromyalgia, primary    • Atkinson syndrome    • Morbid obesity with BMI of 40.0-44.9, adult (HCC)      Past Surgical History:   Procedure Laterality Date   • CARPAL TUNNEL RELEASE Bilateral    •  SECTION      2 C sections   • COLONOSCOPY     • EGD     • CO ARTHRS KNE SURG W/MENISCECTOMY MED/LAT W/SHVG Right 2022    Procedure: ARTHROSCOPIC MENISCECTOMY MEDIAL;  Surgeon: Oscar Hughes DO;  Location: [facility];  Service: Orthopedics   • REDUCTION MAMMAPLASTY Bilateral 2018   • TONSILLECTOMY     • TOTAL ABDOMINAL HYSTERECTOMY      onset: 7/15/14     Family History    Problem Relation Age of Onset   • Alcohol abuse Mother    • Anemia Mother    • Diverticulitis Mother    • Alcohol abuse Father    • Colon cancer Father 42   • Pancreatic cancer Father    • Hypertension Father    • Other Father 55        allen syndrome   • Breast cancer Maternal Grandmother    • Prostate cancer Maternal Grandfather    • Diabetes type II Family    • Cerebral aneurysm Family      Social History     Socioeconomic History   • Marital status: /Civil Union     Spouse name: None   • Number of children: None   • Years of education: None   • Highest education level: None   Occupational History   • None   Tobacco Use   • Smoking status: Never   • Smokeless tobacco: Never   • Tobacco comments:     no passive smoke exposure   Vaping Use   • Vaping status: Never Used   Substance and Sexual Activity   • Alcohol use: Yes     Comment: socially   • Drug use: Never   • Sexual activity: Not Currently   Other Topics Concern   • None   Social History Narrative   • None     Social Determinants of Health     Financial Resource Strain: Not on file   Food Insecurity: Not on file   Transportation Needs: Not on file   Physical Activity: Not on file   Stress: Not on file   Social Connections: Not on file   Intimate Partner Violence: Not on file   Housing Stability: Not on file     Current Outpatient Medications on File Prior to Visit   Medication Sig   • albuterol (PROVENTIL HFA,VENTOLIN HFA) 90 mcg/act inhaler USE 2 INHALATIONS EVERY 6 HOURS AS NEEDED FOR WHEEZING   • famotidine (PEPCID) 40 MG tablet Take 1 tablet (40 mg total) by mouth daily at bedtime   • fexofenadine (ALLEGRA) 180 MG tablet Take 180 mg by mouth   • LORazepam (ATIVAN) 0.5 mg tablet Take 1 tablet (0.5 mg total) by mouth daily as needed for anxiety   • montelukast (SINGULAIR) 10 mg tablet TAKE 1 TABLET EVERY EVENING   • naproxen (NAPROSYN) 500 mg tablet TAKE 1 TABLET TWICE A DAY WITH MEALS   • omeprazole (PriLOSEC) 40 MG capsule 2 (two) times a day  "  • sertraline (ZOLOFT) 50 mg tablet TAKE ONE AND ONE-HALF TABLETS DAILY   • Sodium Fluoride 5000 PPM 1.1 % PSTE BRUSH TWICE A DAY .AFTER BRISHING,EXPECTORATE, BUT DO NOT RINSE   • estradiol (ESTRACE) 0.5 MG tablet Take 0.5 mg by mouth daily   • [DISCONTINUED] tirzepatide (Zepbound) 7.5 mg/0.5 mL auto-injector Inject 0.5 mL (7.5 mg total) under the skin once a week (Patient not taking: Reported on 5/6/2024)     Allergies   Allergen Reactions   • Fluticasone Other (See Comments)     Bloody nose   • Pollen Extract Other (See Comments)     Other reaction(s): Other (See Comments)  SNEEZING SINUS PROBLEMS     Immunization History   Administered Date(s) Administered   • COVID-19 MODERNA VACC 0.5 ML IM 01/27/2021, 02/24/2021, 10/28/2021   • COVID-19 Pfizer Vac BIVALENT Sridhar-sucrose 12 Yr+ IM 11/11/2022   • DT (pediatric) 1980, 1980, 1980, 12/09/1981, 06/04/2002   • H1N1, All Formulations 10/31/2009   • INFLUENZA 12/07/2006, 11/14/2009, 03/06/2014, 10/09/2017, 10/18/2021, 09/26/2022, 10/09/2023   • IPV 1980   • Influenza Split 11/15/2013   • Influenza, injectable, quadrivalent, preservative free 0.5 mL 10/14/2019, 10/16/2020   • MMR 11/04/1981, 08/06/1994   • OPV 1980, 1980   • Td (adult), adsorbed 10/10/2011       Objective     /80 (BP Location: Left arm, Patient Position: Sitting, Cuff Size: Large)   Pulse 66   Temp 98.4 °F (36.9 °C) (Tympanic)   Resp 16   Ht 5' 2\" (1.575 m)   Wt 105 kg (231 lb)   SpO2 98%   BMI 42.25 kg/m²     Physical Exam  Vitals and nursing note reviewed.   Constitutional:       Appearance: She is well-developed.   HENT:      Head: Normocephalic and atraumatic.   Eyes:      Pupils: Pupils are equal, round, and reactive to light.   Cardiovascular:      Rate and Rhythm: Normal rate and regular rhythm.      Heart sounds: Normal heart sounds.   Pulmonary:      Effort: Pulmonary effort is normal.      Breath sounds: Normal breath sounds.   Abdominal:      " General: Bowel sounds are normal.      Palpations: Abdomen is soft.   Musculoskeletal:      Cervical back: Normal range of motion and neck supple.   Lymphadenopathy:      Cervical: No cervical adenopathy.   Skin:     General: Skin is warm.   Neurological:      Mental Status: She is alert and oriented to person, place, and time.       Yanelis Craven MD

## 2024-05-06 NOTE — ASSESSMENT & PLAN NOTE
Elevated fasting glucose but A1c was normal.  Will continue to follow-up low-carb diet and regular exercise.  She was told Wegovy will help.    Repeat fasting glucose and A1c.

## 2024-05-06 NOTE — ASSESSMENT & PLAN NOTE
It was discussed about low-carb diet and regular exercise.  I am going to switch her to Wegovy 0.25 mg weekly.  It was discussed with possible side effects.  Come back in 1 month.

## 2024-05-07 ENCOUNTER — TELEPHONE (OUTPATIENT)
Dept: FAMILY MEDICINE CLINIC | Facility: CLINIC | Age: 44
End: 2024-05-07

## 2024-05-10 NOTE — TELEPHONE ENCOUNTER
PA for Wegovy 0.25mg    Submitted via    []CMM-KEY   [x][facility]-Case ID # [id number]   []Faxed to plan   []Other website   []Phone call Case ID #     Office notes sent, clinical questions answered. Awaiting determination    Turnaround time for your insurance to make a decision on your Prior Authorization can take 7-21 business days.

## 2024-05-14 NOTE — TELEPHONE ENCOUNTER
PA for Wegovy 0.25mg Approved     Date(s) approved April 10, 2024 to December 6, 2024     Case #    Patient advised by          [x] Yoovihart Message  [x] Phone call   []LMOM  []L/M to call office as no active Communication consent on file  []Unable to leave detailed message as VM not approved on Communication consent       Pharmacy advised by    [x]Fax  []Phone call    Approval letter scanned into Media No

## 2024-05-18 ENCOUNTER — APPOINTMENT (OUTPATIENT)
Dept: LAB | Facility: IMAGING CENTER | Age: 44
End: 2024-05-18
Payer: COMMERCIAL

## 2024-05-18 DIAGNOSIS — R73.9 HYPERGLYCEMIA: ICD-10-CM

## 2024-05-18 DIAGNOSIS — E78.49 OTHER HYPERLIPIDEMIA: ICD-10-CM

## 2024-05-18 LAB
ALBUMIN SERPL BCP-MCNC: 4.1 G/DL (ref 3.5–5)
ALP SERPL-CCNC: 49 U/L (ref 34–104)
ALT SERPL W P-5'-P-CCNC: 23 U/L (ref 7–52)
ANION GAP SERPL CALCULATED.3IONS-SCNC: 9 MMOL/L (ref 4–13)
AST SERPL W P-5'-P-CCNC: 16 U/L (ref 13–39)
BASOPHILS # BLD AUTO: 0.04 THOUSANDS/ÂΜL (ref 0–0.1)
BASOPHILS NFR BLD AUTO: 1 % (ref 0–1)
BILIRUB SERPL-MCNC: 0.42 MG/DL (ref 0.2–1)
BUN SERPL-MCNC: 10 MG/DL (ref 5–25)
CALCIUM SERPL-MCNC: 9 MG/DL (ref 8.4–10.2)
CHLORIDE SERPL-SCNC: 104 MMOL/L (ref 96–108)
CHOLEST SERPL-MCNC: 165 MG/DL
CO2 SERPL-SCNC: 28 MMOL/L (ref 21–32)
CREAT SERPL-MCNC: 0.59 MG/DL (ref 0.6–1.3)
EOSINOPHIL # BLD AUTO: 0.07 THOUSAND/ÂΜL (ref 0–0.61)
EOSINOPHIL NFR BLD AUTO: 1 % (ref 0–6)
ERYTHROCYTE [DISTWIDTH] IN BLOOD BY AUTOMATED COUNT: 12.7 % (ref 11.6–15.1)
EST. AVERAGE GLUCOSE BLD GHB EST-MCNC: 100 MG/DL
GFR SERPL CREATININE-BSD FRML MDRD: 111 ML/MIN/1.73SQ M
GLUCOSE P FAST SERPL-MCNC: 97 MG/DL (ref 65–99)
HBA1C MFR BLD: 5.1 %
HCT VFR BLD AUTO: 37.4 % (ref 34.8–46.1)
HDLC SERPL-MCNC: 44 MG/DL
HGB BLD-MCNC: 12.7 G/DL (ref 11.5–15.4)
IMM GRANULOCYTES # BLD AUTO: 0.03 THOUSAND/UL (ref 0–0.2)
IMM GRANULOCYTES NFR BLD AUTO: 0 % (ref 0–2)
LDLC SERPL CALC-MCNC: 90 MG/DL (ref 0–100)
LYMPHOCYTES # BLD AUTO: 2.26 THOUSANDS/ÂΜL (ref 0.6–4.47)
LYMPHOCYTES NFR BLD AUTO: 30 % (ref 14–44)
MCH RBC QN AUTO: 30.3 PG (ref 26.8–34.3)
MCHC RBC AUTO-ENTMCNC: 34 G/DL (ref 31.4–37.4)
MCV RBC AUTO: 89 FL (ref 82–98)
MONOCYTES # BLD AUTO: 0.53 THOUSAND/ÂΜL (ref 0.17–1.22)
MONOCYTES NFR BLD AUTO: 7 % (ref 4–12)
NEUTROPHILS # BLD AUTO: 4.6 THOUSANDS/ÂΜL (ref 1.85–7.62)
NEUTS SEG NFR BLD AUTO: 61 % (ref 43–75)
NRBC BLD AUTO-RTO: 0 /100 WBCS
PLATELET # BLD AUTO: 191 THOUSANDS/UL (ref 149–390)
PMV BLD AUTO: 10.6 FL (ref 8.9–12.7)
POTASSIUM SERPL-SCNC: 4 MMOL/L (ref 3.5–5.3)
PROT SERPL-MCNC: 7.6 G/DL (ref 6.4–8.4)
RBC # BLD AUTO: 4.19 MILLION/UL (ref 3.81–5.12)
SODIUM SERPL-SCNC: 141 MMOL/L (ref 135–147)
TRIGL SERPL-MCNC: 154 MG/DL
TSH SERPL DL<=0.05 MIU/L-ACNC: 2.28 UIU/ML (ref 0.45–4.5)
WBC # BLD AUTO: 7.53 THOUSAND/UL (ref 4.31–10.16)

## 2024-05-18 PROCEDURE — 36415 COLL VENOUS BLD VENIPUNCTURE: CPT

## 2024-05-18 PROCEDURE — 80061 LIPID PANEL: CPT

## 2024-05-18 PROCEDURE — 83036 HEMOGLOBIN GLYCOSYLATED A1C: CPT

## 2024-05-18 PROCEDURE — 84443 ASSAY THYROID STIM HORMONE: CPT

## 2024-05-18 PROCEDURE — 80053 COMPREHEN METABOLIC PANEL: CPT

## 2024-05-18 PROCEDURE — 85025 COMPLETE CBC W/AUTO DIFF WBC: CPT

## 2024-05-27 DIAGNOSIS — F41.9 ANXIETY: ICD-10-CM

## 2024-06-11 DIAGNOSIS — J30.9 ALLERGIC RHINITIS, UNSPECIFIED SEASONALITY, UNSPECIFIED TRIGGER: ICD-10-CM

## 2024-06-11 DIAGNOSIS — K21.9 GASTROESOPHAGEAL REFLUX DISEASE WITHOUT ESOPHAGITIS: ICD-10-CM

## 2024-06-11 RX ORDER — MONTELUKAST SODIUM 10 MG/1
TABLET ORAL
Qty: 90 TABLET | Refills: 1 | Status: SHIPPED | OUTPATIENT
Start: 2024-06-11

## 2024-06-11 RX ORDER — FAMOTIDINE 40 MG/1
40 TABLET, FILM COATED ORAL
Qty: 90 TABLET | Refills: 1 | Status: SHIPPED | OUTPATIENT
Start: 2024-06-11

## 2024-06-21 ENCOUNTER — TELEPHONE (OUTPATIENT)
Dept: FAMILY MEDICINE CLINIC | Facility: CLINIC | Age: 44
End: 2024-06-21

## 2024-08-09 DIAGNOSIS — J45.20 MILD INTERMITTENT ASTHMA, UNSPECIFIED WHETHER COMPLICATED: ICD-10-CM

## 2024-08-09 RX ORDER — ALBUTEROL SULFATE 90 UG/1
AEROSOL, METERED RESPIRATORY (INHALATION)
Qty: 17 G | Refills: 2 | Status: SHIPPED | OUTPATIENT
Start: 2024-08-09

## 2024-08-13 ENCOUNTER — TELEPHONE (OUTPATIENT)
Dept: FAMILY MEDICINE CLINIC | Facility: CLINIC | Age: 44
End: 2024-08-13

## 2024-08-20 ENCOUNTER — TELEPHONE (OUTPATIENT)
Dept: FAMILY MEDICINE CLINIC | Facility: CLINIC | Age: 44
End: 2024-08-20

## 2024-08-23 NOTE — TELEPHONE ENCOUNTER
"PA for Zepbound 2.5MG/0.5ML pen-injectors CANCELLED due to     []Approval on file-dates approved   []Medication already on Formulary  []Brand Name Preferred  []Patient no longer covered by insurance    Patient advised by     []My Chart Message  []Phone call    Message sent to office clinical pool   Yes    Scanned into Media  no    See encounter 5/6/2024 - \"It was discussed about low-carb diet and regular exercise. I am going to switch her to Wegovy 0.25 mg weekly. It was discussed with possible side effects. Come back in 1 month.\"    Zepbound not on patients medication list.   "

## 2024-08-27 ENCOUNTER — TELEPHONE (OUTPATIENT)
Age: 44
End: 2024-08-27

## 2024-10-11 ENCOUNTER — TELEPHONE (OUTPATIENT)
Dept: FAMILY MEDICINE CLINIC | Facility: CLINIC | Age: 44
End: 2024-10-11

## 2024-10-11 ENCOUNTER — OFFICE VISIT (OUTPATIENT)
Dept: FAMILY MEDICINE CLINIC | Facility: CLINIC | Age: 44
End: 2024-10-11
Payer: COMMERCIAL

## 2024-10-11 VITALS
TEMPERATURE: 98.1 F | DIASTOLIC BLOOD PRESSURE: 84 MMHG | HEART RATE: 83 BPM | BODY MASS INDEX: 44.94 KG/M2 | OXYGEN SATURATION: 96 % | RESPIRATION RATE: 16 BRPM | HEIGHT: 62 IN | WEIGHT: 244.2 LBS | SYSTOLIC BLOOD PRESSURE: 136 MMHG

## 2024-10-11 DIAGNOSIS — M79.7 FIBROMYALGIA: ICD-10-CM

## 2024-10-11 DIAGNOSIS — E66.01 CLASS 3 SEVERE OBESITY DUE TO EXCESS CALORIES WITHOUT SERIOUS COMORBIDITY WITH BODY MASS INDEX (BMI) OF 40.0 TO 44.9 IN ADULT (HCC): Primary | ICD-10-CM

## 2024-10-11 DIAGNOSIS — G43.709 CHRONIC MIGRAINE WITHOUT AURA WITHOUT STATUS MIGRAINOSUS, NOT INTRACTABLE: ICD-10-CM

## 2024-10-11 DIAGNOSIS — E66.813 CLASS 3 SEVERE OBESITY DUE TO EXCESS CALORIES WITHOUT SERIOUS COMORBIDITY WITH BODY MASS INDEX (BMI) OF 40.0 TO 44.9 IN ADULT (HCC): Primary | ICD-10-CM

## 2024-10-11 DIAGNOSIS — K21.9 GASTROESOPHAGEAL REFLUX DISEASE WITHOUT ESOPHAGITIS: ICD-10-CM

## 2024-10-11 DIAGNOSIS — E78.49 OTHER HYPERLIPIDEMIA: ICD-10-CM

## 2024-10-11 PROCEDURE — 99214 OFFICE O/P EST MOD 30 MIN: CPT | Performed by: FAMILY MEDICINE

## 2024-10-11 RX ORDER — TIRZEPATIDE 2.5 MG/.5ML
2.5 INJECTION, SOLUTION SUBCUTANEOUS WEEKLY
Qty: 2 ML | Refills: 0 | Status: SHIPPED | OUTPATIENT
Start: 2024-10-11 | End: 2024-11-08

## 2024-10-11 NOTE — ASSESSMENT & PLAN NOTE
Not well-controlled.  Discussed about low-fat diet and regular exercise.  Continue to monitor fasting lipid profile.

## 2024-10-11 NOTE — ASSESSMENT & PLAN NOTE
Stable. Advised to continue with stretching exercises learned at physical therapy. Patient will continue to follow with Rheumatology.

## 2024-10-11 NOTE — ASSESSMENT & PLAN NOTE
Pt prefers Zepbound over Wegovy. Prescription given for tirzepatide (Zepbound). Counseled patient regarding well balanced diet and exercise. Labs ordered in office today, will follow up in 6 weeks.     Orders:    tirzepatide (Zepbound) 2.5 mg/0.5 mL auto-injector; Inject 0.5 mL (2.5 mg total) under the skin once a week for 28 days    CBC and differential; Future    Comprehensive metabolic panel; Future    Lipid Panel with Direct LDL reflex; Future    TSH, 3rd generation with Free T4 reflex; Future

## 2024-10-11 NOTE — TELEPHONE ENCOUNTER
PA for Zepbound 2.5MG/0.5ML SUBMITTED     via    [x]CMM-KEY: [id number]  []Surescripts-Case ID #   []Availity-Auth ID # NDC #   []Faxed to plan   []Other website   []Phone call Case ID #     Office notes sent, clinical questions answered. Awaiting determination    Turnaround time for your insurance to make a decision on your Prior Authorization can take 7-21 business days.

## 2024-10-11 NOTE — PROGRESS NOTES
Ambulatory Visit  Name: Laura Matson      : 1980      MRN: [id number]  Encounter Provider: Yanelis Craven MD  Encounter Date: 10/11/2024   Encounter department: [facility]    Assessment & Plan  Class 3 severe obesity due to excess calories without serious comorbidity with body mass index (BMI) of 40.0 to 44.9 in adult (HCC)    Pt prefers Zepbound over Wegovy. Prescription given for tirzepatide (Zepbound). Counseled patient regarding well balanced diet and exercise. Labs ordered in office today, will follow up in 6 weeks.     Orders:    tirzepatide (Zepbound) 2.5 mg/0.5 mL auto-injector; Inject 0.5 mL (2.5 mg total) under the skin once a week for 28 days    CBC and differential; Future    Comprehensive metabolic panel; Future    Lipid Panel with Direct LDL reflex; Future    TSH, 3rd generation with Free T4 reflex; Future    Fibromyalgia  Stable. Advised to continue with stretching exercises learned at physical therapy. Patient will continue to follow with Rheumatology.       Gastroesophageal reflux disease without esophagitis  Well controlled with omeprazole and Pepcid, will continue to monitor.        Other hyperlipidemia  Not well-controlled.  Discussed about low-fat diet and regular exercise.  Continue to monitor fasting lipid profile.         Chronic migraine without aura without status migrainosus, not intractable  Stable.  We will continue to monitor.            History of Present Illness     TK is a 44 y.o. female with a PMH of fibromyalgia who presents to the office today to follow up for weight management and GERD. Since last visit in May, patient has not used Wegovy as prescribed due to lack of availability at pharmacy and has gained 13 lbs. She reports that she has been unable to exercise due to her fibromyalgia that results in muscle spasms when she walks. She is followed by Rheumatology and has completed physical therapy. She continues to do the stretching  exercises she learned at PT; feels that it helps. Her GERD is controlled with Pepcid and omeprazole. She denies reflux, N/V/D, abdominal pain, chest pain, and SOB.       History obtained from : patient  Review of Systems   Constitutional:  Negative for chills and fever.   HENT:  Negative for congestion and sore throat.    Eyes:  Negative for pain and visual disturbance.   Cardiovascular:  Negative for chest pain and leg swelling.   Gastrointestinal:  Negative for abdominal pain, diarrhea, nausea and vomiting.   Endocrine: Negative for polyphagia and polyuria.   Genitourinary:  Negative for difficulty urinating and dysuria.   Musculoskeletal:  Positive for myalgias.   Skin:  Negative for rash.   Neurological:  Negative for dizziness and headaches.     Current Outpatient Medications on File Prior to Visit   Medication Sig Dispense Refill    albuterol (PROVENTIL HFA,VENTOLIN HFA) 90 mcg/act inhaler USE 2 INHALATIONS EVERY 6 HOURS AS NEEDED FOR WHEEZING 17 g 2    famotidine (PEPCID) 40 MG tablet TAKE 1 TABLET DAILY AT BEDTIME 90 tablet 1    fexofenadine (ALLEGRA) 180 MG tablet Take 180 mg by mouth      LORazepam (ATIVAN) 0.5 mg tablet Take 1 tablet (0.5 mg total) by mouth daily as needed for anxiety 20 tablet 0    montelukast (SINGULAIR) 10 mg tablet TAKE 1 TABLET EVERY EVENING 90 tablet 1    naproxen (NAPROSYN) 500 mg tablet TAKE 1 TABLET TWICE A DAY WITH MEALS 180 tablet 1    omeprazole (PriLOSEC) 40 MG capsule 2 (two) times a day  1    sertraline (ZOLOFT) 50 mg tablet TAKE ONE AND ONE-HALF TABLETS DAILY 135 tablet 1    Sodium Fluoride 5000 PPM 1.1 % PSTE BRUSH TWICE A DAY .AFTER BRISHING,EXPECTORATE, BUT DO NOT RINSE      estradiol (ESTRACE) 0.5 MG tablet Take 0.5 mg by mouth daily      [DISCONTINUED] Semaglutide-Weight Management (WEGOVY) 0.25 MG/0.5ML Inject 0.5 mL (0.25 mg total) under the skin once a week (Patient not taking: Reported on 10/11/2024) 2 mL 0     No current facility-administered medications on file  "prior to visit.      Social History     Tobacco Use    Smoking status: Never    Smokeless tobacco: Never    Tobacco comments:     no passive smoke exposure   Vaping Use    Vaping status: Never Used   Substance and Sexual Activity    Alcohol use: Yes     Comment: socially    Drug use: Never    Sexual activity: Not Currently         Objective     /84 (BP Location: Left arm, Patient Position: Sitting, Cuff Size: Large)   Pulse 83   Temp 98.1 °F (36.7 °C) (Tympanic)   Resp 16   Ht 5' 2\" (1.575 m)   Wt 111 kg (244 lb 3.2 oz)   SpO2 96%   BMI 44.66 kg/m²     Physical Exam  Vitals and nursing note reviewed.   Constitutional:       General: She is not in acute distress.     Appearance: She is well-developed. She is obese.   HENT:      Head: Normocephalic and atraumatic.      Right Ear: Tympanic membrane, ear canal and external ear normal.      Left Ear: Tympanic membrane, ear canal and external ear normal.      Nose: Nose normal.      Mouth/Throat:      Mouth: Mucous membranes are moist.   Eyes:      Conjunctiva/sclera: Conjunctivae normal.      Pupils: Pupils are equal, round, and reactive to light.   Cardiovascular:      Rate and Rhythm: Normal rate and regular rhythm.      Heart sounds: No murmur heard.     No friction rub. No gallop.   Pulmonary:      Effort: Pulmonary effort is normal. No respiratory distress.      Breath sounds: Normal breath sounds.   Musculoskeletal:         General: No swelling.      Cervical back: Neck supple.   Skin:     General: Skin is warm and dry.      Capillary Refill: Capillary refill takes less than 2 seconds.   Neurological:      Mental Status: She is alert.   Psychiatric:         Mood and Affect: Mood normal.         "

## 2024-11-14 ENCOUNTER — OFFICE VISIT (OUTPATIENT)
Dept: SLEEP CENTER | Facility: CLINIC | Age: 44
End: 2024-11-14
Payer: COMMERCIAL

## 2024-11-14 VITALS
BODY MASS INDEX: 44.9 KG/M2 | DIASTOLIC BLOOD PRESSURE: 85 MMHG | SYSTOLIC BLOOD PRESSURE: 141 MMHG | WEIGHT: 244 LBS | HEIGHT: 62 IN | HEART RATE: 70 BPM

## 2024-11-14 DIAGNOSIS — G47.33 OSA (OBSTRUCTIVE SLEEP APNEA): Primary | ICD-10-CM

## 2024-11-14 PROCEDURE — 99213 OFFICE O/P EST LOW 20 MIN: CPT | Performed by: PSYCHIATRY & NEUROLOGY

## 2024-11-14 NOTE — ASSESSMENT & PLAN NOTE
-She is doing great, her AHI is normal and she is very consistent in CPAP use  -Tolerated the CPAP pressure increase earlier this year, of note she describes less sleepiness and her AHI is further improved  -Placed an order to renew supplies and we will follow-up with her in 1 year.  Discussed she could follow-up with a virtual visit if that is easier for her  Orders:    PAP DME Resupply/Reorder

## 2024-11-14 NOTE — PROGRESS NOTES
Name: Laura Matson      : 1980      MRN: [id number]  Encounter Provider: Dudley Berry MD  Encounter Date: 2024   Encounter department: [facility]    :  Assessment & Plan  SUNNY (obstructive sleep apnea)  -She is doing great, her AHI is normal and she is very consistent in CPAP use  -Tolerated the CPAP pressure increase earlier this year, of note she describes less sleepiness and her AHI is further improved  -Placed an order to renew supplies and we will follow-up with her in 1 year.  Discussed she could follow-up with a virtual visit if that is easier for her  Orders:    PAP DME Resupply/Reorder      History of Present Illness     HPI            This is a 44-year-old female who returns for a follow-up visit, she has a history of obstructive sleep apnea, last seen by me in January.  In brief, she had moderate obstructive sleep apnea, respiratory event index 22.8 on a home sleep test in 2023. Events were much worse in the supine position on that test. Of 142 respiratory events she had 91 obstructive apneas and 51 hypopneas. Event related desaturations were observed.  Weight at the time of testing was approximately 230 pounds    Laura is happy with how she is doing.  She finds that she is sleeping well and does not have sleepiness.  At her visit last year she described she would sometimes feel sleepy after work, this is no longer the case.  I increase her CPAP pressure last year from a maximum CPAP pressure 15 to a maximum of 17 cm H2O.  She tolerated this well without any difficulty.  She denies any CPAP side effect such as dry mouth, aerophagia, or nasal congestion.    CPAP data reviewed today  AirSense 11 set at 5 to 17 cm H2O  AHI 0.8  Median pressure-12.3, 95% pressure-16 cm H2O  Average session 7 hours 34 minutes, usage 30 over 30 days  Uses under nose FFM    She is happy with her sleep quality and energy level during the day.    She was bed at 10 PM, is  "asleep within 30 minutes.  A few nights she will sleep through the night, if she awakens, she will wake in 1 or 2 times during the night.  She usually falls back asleep without difficulty.  She is up at 6 AM, sleeping 7 hours a night.    She is usually refreshed upon awakening, sometimes sleepy during the day.  She does not take naps.  She very rarely dozes.    Does not wake with headaches anymore, these used to be prominent prior to treating SUNNY.            Sitting and reading: Would never doze  Watching TV: Would never doze  Sitting, inactive in a public place (e.g. a theatre or a meeting): Would never doze  As a passenger in a car for an hour without a break: Would never doze  Lying down to rest in the afternoon when circumstances permit: Would never doze  Sitting and talking to someone: Would never doze  Sitting quietly after a lunch without alcohol: Would never doze  In a car, while stopped for a few minutes in traffic: Would never doze  Total score: 0       Review of Systems  Pertinent positives/negatives included in HPI and also as noted:         Objective   /85 (BP Location: Left arm, Patient Position: Sitting, Cuff Size: Large)   Pulse 70   Ht 5' 2\" (1.575 m)   Wt 111 kg (244 lb)   BMI 44.63 kg/m²        Physical Exam  Visit Vitals  /85 (BP Location: Left arm, Patient Position: Sitting, Cuff Size: Large)   Pulse 70   Ht 5' 2\" (1.575 m)   Wt 111 kg (244 lb)   BMI 44.63 kg/m²   OB Status Hysterectomy   Smoking Status Never   BSA 2.08 m²       Mallampati Grade : Mallampati 4  Oropharynx : AP narrowing         Hard Palate : normal  and high arched                              Data  Lab Results   Component Value Date    HGB 12.7 05/18/2024    HCT 37.4 05/18/2024    MCV 89 05/18/2024      Lab Results   Component Value Date    CALCIUM 9.0 05/18/2024    K 4.0 05/18/2024    CO2 28 05/18/2024     05/18/2024    BUN 10 05/18/2024    CREATININE 0.59 (L) 05/18/2024     Lab Results   Component Value " Date    IRON 88 11/20/2018    FERRITIN 222 11/20/2018     Lab Results   Component Value Date    AST 16 05/18/2024    ALT 23 05/18/2024

## 2024-11-19 DIAGNOSIS — E66.01 CLASS 3 SEVERE OBESITY DUE TO EXCESS CALORIES WITHOUT SERIOUS COMORBIDITY WITH BODY MASS INDEX (BMI) OF 40.0 TO 44.9 IN ADULT (HCC): ICD-10-CM

## 2024-11-19 DIAGNOSIS — E66.813 CLASS 3 SEVERE OBESITY DUE TO EXCESS CALORIES WITHOUT SERIOUS COMORBIDITY WITH BODY MASS INDEX (BMI) OF 40.0 TO 44.9 IN ADULT (HCC): ICD-10-CM

## 2024-11-20 RX ORDER — TIRZEPATIDE 2.5 MG/.5ML
INJECTION, SOLUTION SUBCUTANEOUS
Qty: 4 ML | Refills: 0 | Status: SHIPPED | OUTPATIENT
Start: 2024-11-20

## 2024-11-21 ENCOUNTER — TELEPHONE (OUTPATIENT)
Dept: SLEEP CENTER | Facility: CLINIC | Age: 44
End: 2024-11-21

## 2024-11-22 DIAGNOSIS — F41.9 ANXIETY: ICD-10-CM

## 2024-11-29 ENCOUNTER — TELEPHONE (OUTPATIENT)
Dept: FAMILY MEDICINE CLINIC | Facility: CLINIC | Age: 44
End: 2024-11-29

## 2024-11-29 NOTE — TELEPHONE ENCOUNTER
PA for zepbound 2.5mg  previously DENIED    Reason:(Screenshot if applicable)        Message sent to office clinical pool Yes    Denial letter scanned into Media No see above    Appeal started no (Provider will need to decide if appeal is warranted and send clinical documentation to Prior Authorization Team for initiation.)    **Please follow up with your patient regarding denial and next steps**

## 2024-11-29 NOTE — TELEPHONE ENCOUNTER
PA for zepbound 2.5mg SUBMITTED to [facility] rx     via    []CMM-KEY:    []Surescripts-Case ID #    []Availity-Auth ID #  NDC #    []Faxed to plan   []Other website    []Phone call Case ID #      []PA sent as URGENT    All office notes, labs and other pertaining documents and studies sent. Clinical questions answered. Awaiting determination from insurance company.     Turnaround time for your insurance to make a decision on your Prior Authorization can take 7-21 business days.

## 2024-12-03 ENCOUNTER — RA CDI HCC (OUTPATIENT)
Dept: OTHER | Facility: HOSPITAL | Age: 44
End: 2024-12-03

## 2024-12-03 NOTE — PROGRESS NOTES
HCC coding opportunities          Chart Reviewed number of suggestions sent to Provider: 1   Depression- Please further classify as per ICD 10 coding guidelines      Patients Insurance        Commercial Insurance: [employer]

## 2024-12-09 DIAGNOSIS — K21.9 GASTROESOPHAGEAL REFLUX DISEASE WITHOUT ESOPHAGITIS: ICD-10-CM

## 2024-12-09 DIAGNOSIS — J30.9 ALLERGIC RHINITIS, UNSPECIFIED SEASONALITY, UNSPECIFIED TRIGGER: ICD-10-CM

## 2024-12-10 RX ORDER — FAMOTIDINE 40 MG/1
40 TABLET, FILM COATED ORAL
Qty: 90 TABLET | Refills: 1 | Status: SHIPPED | OUTPATIENT
Start: 2024-12-10

## 2024-12-10 RX ORDER — MONTELUKAST SODIUM 10 MG/1
10 TABLET ORAL EVERY EVENING
Qty: 90 TABLET | Refills: 1 | Status: SHIPPED | OUTPATIENT
Start: 2024-12-10

## 2025-02-26 DIAGNOSIS — J45.20 MILD INTERMITTENT ASTHMA, UNSPECIFIED WHETHER COMPLICATED: ICD-10-CM

## 2025-02-26 RX ORDER — ALBUTEROL SULFATE 90 UG/1
2 INHALANT RESPIRATORY (INHALATION) EVERY 6 HOURS PRN
Qty: 17 G | Refills: 1 | Status: SHIPPED | OUTPATIENT
Start: 2025-02-26

## 2025-05-21 DIAGNOSIS — F41.9 ANXIETY: ICD-10-CM

## 2025-05-21 NOTE — TELEPHONE ENCOUNTER
Pt last seen 10/11/24 and she was to return in 6 weeks which should of been around 11/22/24. I lm for pt to call office. We can send a small supply to local pharmacy until her visit once she scheduled.

## 2025-05-28 ENCOUNTER — OFFICE VISIT (OUTPATIENT)
Dept: FAMILY MEDICINE CLINIC | Facility: CLINIC | Age: 45
End: 2025-05-28
Payer: COMMERCIAL

## 2025-05-28 VITALS
SYSTOLIC BLOOD PRESSURE: 132 MMHG | HEART RATE: 70 BPM | BODY MASS INDEX: 45.08 KG/M2 | HEIGHT: 62 IN | DIASTOLIC BLOOD PRESSURE: 88 MMHG | OXYGEN SATURATION: 97 % | TEMPERATURE: 96.6 F | WEIGHT: 245 LBS | RESPIRATION RATE: 16 BRPM

## 2025-05-28 DIAGNOSIS — E78.49 OTHER HYPERLIPIDEMIA: ICD-10-CM

## 2025-05-28 DIAGNOSIS — J45.20 MILD INTERMITTENT ASTHMA WITHOUT COMPLICATION: Primary | ICD-10-CM

## 2025-05-28 DIAGNOSIS — Z12.31 ENCOUNTER FOR SCREENING MAMMOGRAM FOR BREAST CANCER: ICD-10-CM

## 2025-05-28 DIAGNOSIS — F32.A ANXIETY AND DEPRESSION: ICD-10-CM

## 2025-05-28 DIAGNOSIS — Z00.00 HEALTHCARE MAINTENANCE: ICD-10-CM

## 2025-05-28 DIAGNOSIS — M79.7 FIBROMYALGIA: ICD-10-CM

## 2025-05-28 DIAGNOSIS — K21.9 GASTROESOPHAGEAL REFLUX DISEASE WITHOUT ESOPHAGITIS: ICD-10-CM

## 2025-05-28 DIAGNOSIS — F41.9 ANXIETY AND DEPRESSION: ICD-10-CM

## 2025-05-28 DIAGNOSIS — G47.33 OSA (OBSTRUCTIVE SLEEP APNEA): ICD-10-CM

## 2025-05-28 PROCEDURE — 99396 PREV VISIT EST AGE 40-64: CPT | Performed by: NURSE PRACTITIONER

## 2025-05-28 PROCEDURE — 99203 OFFICE O/P NEW LOW 30 MIN: CPT | Performed by: NURSE PRACTITIONER

## 2025-05-28 NOTE — ASSESSMENT & PLAN NOTE
- Well controlled.  - Continue Singulair 10 mg every evening.   - Albuterol as needed.  - Will continue to monitor.

## 2025-05-28 NOTE — ASSESSMENT & PLAN NOTE
- Well controlled on Zoloft 75 mg daily. Continue same.   - Will continue to monitor.   Orders:  •  sertraline (ZOLOFT) 50 mg tablet; Take 1.5 tablets (75 mg total) by mouth daily

## 2025-05-28 NOTE — PROGRESS NOTES
Name: Laura Matson      : 1980      MRN: [id number]  Encounter Provider: CARMINE Cisneros  Encounter Date: 2025   Encounter department: [facility]  :  Assessment & Plan  Mild intermittent asthma without complication  - Well controlled.  - Continue Singulair 10 mg every evening.   - Albuterol as needed.  - Will continue to monitor.        SUNNY (obstructive sleep apnea)  - Compliant with CPAP.  - Continue routine follow up with Sleep Medicine.        Gastroesophageal reflux disease without esophagitis  - Well controlled on Omeprazole and Pepcid. Continue same.   - Avoid triggering food and beverage.  - Will continue to monitor.        Anxiety and depression  - Well controlled on Zoloft 75 mg daily. Continue same.   - Will continue to monitor.   Orders:  •  sertraline (ZOLOFT) 50 mg tablet; Take 1.5 tablets (75 mg total) by mouth daily    Fibromyalgia  - Continue follow up with Rheumatology.        Other hyperlipidemia  - Not well controlled.  - Encourage low fat diet and regular exercise.  - Will continue to monitor fasting lipid panel.        Healthcare maintenance  - Reviewed immunizations and screenings.   - UTD with dental, vision, and GYN exams.   - Mammogram ordered.   - UTD with colonoscopy.        Encounter for screening mammogram for breast cancer    Orders:  •  Mammo screening bilateral w 3d and cad; Future           History of Present Illness   Patient with PMH of asthma, GERD, migraines, HLD, SUNNY, fibromyalgia, and anxiety presents to office today for follow up. She is taking her prescribed medications and reports no side effects. She is due for updated blood work. She denies any significant concerns or complaints today.        Review of Systems   Constitutional:  Negative for fatigue and fever.   HENT:  Negative for congestion, rhinorrhea and trouble swallowing.    Eyes:  Negative for visual disturbance.   Respiratory:  Negative for cough and shortness  "of breath.    Cardiovascular:  Negative for chest pain and palpitations.   Gastrointestinal:  Negative for abdominal pain and blood in stool.   Endocrine: Negative for cold intolerance and heat intolerance.   Genitourinary:  Negative for difficulty urinating, dysuria and frequency.   Musculoskeletal:  Negative for gait problem.   Skin:  Negative for rash.   Neurological:  Negative for dizziness, syncope and headaches.   Hematological:  Negative for adenopathy.   Psychiatric/Behavioral:  Negative for behavioral problems.        Objective   /88 (BP Location: Left arm, Patient Position: Sitting, Cuff Size: Standard)   Pulse 70   Temp (!) 96.6 °F (35.9 °C) (Tympanic)   Resp 16   Ht 5' 2\" (1.575 m)   Wt 111 kg (245 lb)   SpO2 97%   BMI 44.81 kg/m²      Physical Exam  Vitals and nursing note reviewed.   Constitutional:       General: She is not in acute distress.     Appearance: Normal appearance. She is well-developed.   HENT:      Head: Normocephalic and atraumatic.      Right Ear: Tympanic membrane, ear canal and external ear normal.      Left Ear: Tympanic membrane, ear canal and external ear normal.      Nose: Nose normal.      Mouth/Throat:      Mouth: Mucous membranes are moist.     Eyes:      Conjunctiva/sclera: Conjunctivae normal.      Pupils: Pupils are equal, round, and reactive to light.       Cardiovascular:      Rate and Rhythm: Normal rate and regular rhythm.      Heart sounds: Normal heart sounds.   Pulmonary:      Effort: Pulmonary effort is normal.      Breath sounds: Normal breath sounds.   Abdominal:      General: Bowel sounds are normal.      Palpations: Abdomen is soft.     Musculoskeletal:         General: Normal range of motion.      Cervical back: Normal range of motion.     Skin:     General: Skin is warm and dry.     Neurological:      Mental Status: She is alert and oriented to person, place, and time.     Psychiatric:         Mood and Affect: Mood normal.         Behavior: " Behavior normal.

## 2025-05-28 NOTE — ASSESSMENT & PLAN NOTE
- Reviewed immunizations and screenings.   - UTD with dental, vision, and GYN exams.   - Mammogram ordered.   - UTD with colonoscopy.

## 2025-05-28 NOTE — ASSESSMENT & PLAN NOTE
- Not well controlled.  - Encourage low fat diet and regular exercise.  - Will continue to monitor fasting lipid panel.

## 2025-05-28 NOTE — ASSESSMENT & PLAN NOTE
- Well controlled on Omeprazole and Pepcid. Continue same.   - Avoid triggering food and beverage.  - Will continue to monitor.

## 2025-05-29 ENCOUNTER — TELEPHONE (OUTPATIENT)
Dept: ADMINISTRATIVE | Facility: OTHER | Age: 45
End: 2025-05-29

## 2025-05-29 NOTE — TELEPHONE ENCOUNTER
Upon review of the In Basket request we were able to locate, review, and update the patient chart as requested for CRC: Colonoscopy.    Any additional questions or concerns should be emailed to the Practice Liaisons via the appropriate education email address, please do not reply via In Basket.    Thank you  Joaquin Beckham MA   [facility]

## 2025-05-29 NOTE — TELEPHONE ENCOUNTER
----- Message from Ingrid MCQUEEN sent at 5/28/2025  4:33 PM EDT -----  05/28/25 4:33 PMHello, our patient Laura Matson has had CRC: Colonoscopy completed/performed. Please assist in updating the patient chart by locating document in melissa everywhere done at [facility]. The date of service is March 2025.Thank you,MANUELA Parikh [facility]

## 2025-06-06 DIAGNOSIS — J30.9 ALLERGIC RHINITIS, UNSPECIFIED SEASONALITY, UNSPECIFIED TRIGGER: ICD-10-CM

## 2025-06-06 DIAGNOSIS — K21.9 GASTROESOPHAGEAL REFLUX DISEASE WITHOUT ESOPHAGITIS: ICD-10-CM

## 2025-06-06 RX ORDER — FAMOTIDINE 40 MG/1
40 TABLET, FILM COATED ORAL
Qty: 90 TABLET | Refills: 1 | Status: SHIPPED | OUTPATIENT
Start: 2025-06-06

## 2025-06-06 RX ORDER — MONTELUKAST SODIUM 10 MG/1
10 TABLET ORAL EVERY EVENING
Qty: 90 TABLET | Refills: 1 | Status: SHIPPED | OUTPATIENT
Start: 2025-06-06

## 2025-06-27 PROBLEM — Z00.00 HEALTHCARE MAINTENANCE: Status: RESOLVED | Noted: 2025-05-28 | Resolved: 2025-06-27

## 2025-07-10 DIAGNOSIS — J45.20 MILD INTERMITTENT ASTHMA, UNSPECIFIED WHETHER COMPLICATED: ICD-10-CM

## 2025-07-11 RX ORDER — ALBUTEROL SULFATE 90 UG/1
2 INHALANT RESPIRATORY (INHALATION) EVERY 6 HOURS PRN
Qty: 17 G | Refills: 1 | Status: SHIPPED | OUTPATIENT
Start: 2025-07-11

## (undated) DEVICE — GAUZE SPONGES,16 PLY: Brand: CURITY

## (undated) DEVICE — COBAN 6 IN STERILE

## (undated) DEVICE — BETHLEHEM UNIVERSAL  ARTHRO PK: Brand: CARDINAL HEALTH

## (undated) DEVICE — SUT MONOCRYL 4-0 PS-2 27 IN Y426H

## (undated) DEVICE — PADDING CAST 6IN COTTON STRL

## (undated) DEVICE — STIRRUP STRAP ADULT DISP

## (undated) DEVICE — CUFF TOURNIQUET 30 X 4 IN QUICK CONNECT DISP 1BLA

## (undated) DEVICE — GLOVE SRG LF STRL BGL SKNSNS 8.5 PF

## (undated) DEVICE — 3M™ IOBAN™ 2 ANTIMICROBIAL INCISE DRAPE 6650EZ: Brand: IOBAN™ 2

## (undated) DEVICE — LIGHT GLOVE GREEN

## (undated) DEVICE — UNDERGLOVE PROTEXIS  BLUE SZ 7

## (undated) DEVICE — TIBURON EXTREMITY DRAPE WITH ARMBOARD COVERS: Brand: CONVERTORS

## (undated) DEVICE — DISPOSABLE OR TOWEL: Brand: CARDINAL HEALTH

## (undated) DEVICE — NEEDLE FILTER 5 MICR 19G X 1.5IN

## (undated) DEVICE — 3M™ STERI-DRAPE™ U-DRAPE 1015: Brand: STERI-DRAPE™

## (undated) DEVICE — STERILE POLYISOPRENE POWDER-FREE SURGICAL GLOVES WITH EMOLLIENT COATING: Brand: PROTEXIS

## (undated) DEVICE — BLADE SHAVER DISSECTOR  4MM 13CM CRV COOLCUT

## (undated) DEVICE — WEBRIL 6 IN UNSTERILE

## (undated) DEVICE — GLOVE SRG LF STRL BGL SKNSNS 6.5 PF

## (undated) DEVICE — CHLORAPREP HI-LITE 26ML ORANGE

## (undated) DEVICE — ARTHROSCOPY FLOOR MAT

## (undated) DEVICE — OCCLUSIVE GAUZE STRIP,3% BISMUTH TRIBROMOPHENATE IN PETROLATUM BLEND: Brand: XEROFORM

## (undated) DEVICE — NEEDLE BLUNT 18 G X 1 1/2IN

## (undated) DEVICE — SCD SEQUENTIAL COMPRESSION COMFORT SLEEVE MEDIUM KNEE LENGTH: Brand: KENDALL SCD

## (undated) DEVICE — STOCKINETTE,IMPERVIOUS,12X48,STERILE: Brand: MEDLINE

## (undated) DEVICE — TUBING ARTHROSCOPIC WAVE  MAIN PUMP

## (undated) DEVICE — ACE WRAP 6 IN UNSTERILE

## (undated) DEVICE — 3M™ STERI-STRIP™ REINFORCED ADHESIVE SKIN CLOSURES, R1547, 1/2 IN X 4 IN (12 MM X 100 MM), 6 STRIPS/ENVELOPE: Brand: 3M™ STERI-STRIP™

## (undated) DEVICE — SURGICAL GOWN, XL SMARTSLEEVE: Brand: CONVERTORS

## (undated) DEVICE — DRAPE SHEET THREE QUARTER

## (undated) DEVICE — ABDOMINAL PAD: Brand: DERMACEA

## (undated) DEVICE — SYRINGE 3ML LL

## (undated) DEVICE — 4-PORT MANIFOLD: Brand: NEPTUNE 2

## (undated) DEVICE — INTENDED FOR TISSUE SEPARATION, AND OTHER PROCEDURES THAT REQUIRE A SHARP SURGICAL BLADE TO PUNCTURE OR CUT.: Brand: BARD-PARKER ® SAFETYLOCK CARBON RIB-BACK BLADES

## (undated) DEVICE — SYRINGE 30ML LL